# Patient Record
Sex: MALE | Race: WHITE | Employment: OTHER | URBAN - METROPOLITAN AREA
[De-identification: names, ages, dates, MRNs, and addresses within clinical notes are randomized per-mention and may not be internally consistent; named-entity substitution may affect disease eponyms.]

---

## 2018-03-29 ENCOUNTER — OFFICE VISIT (OUTPATIENT)
Dept: CARDIOLOGY CLINIC | Age: 83
End: 2018-03-29
Payer: MEDICARE

## 2018-03-29 VITALS
RESPIRATION RATE: 12 BRPM | DIASTOLIC BLOOD PRESSURE: 80 MMHG | SYSTOLIC BLOOD PRESSURE: 118 MMHG | WEIGHT: 179 LBS | HEART RATE: 89 BPM | BODY MASS INDEX: 25.06 KG/M2 | HEIGHT: 71 IN

## 2018-03-29 DIAGNOSIS — Z78.9 STATIN INTOLERANCE: ICD-10-CM

## 2018-03-29 DIAGNOSIS — E78.00 PURE HYPERCHOLESTEROLEMIA: ICD-10-CM

## 2018-03-29 DIAGNOSIS — I25.10 CORONARY ARTERY DISEASE INVOLVING NATIVE CORONARY ARTERY OF NATIVE HEART WITHOUT ANGINA PECTORIS: Primary | ICD-10-CM

## 2018-03-29 DIAGNOSIS — I25.2 HISTORY OF MI (MYOCARDIAL INFARCTION): ICD-10-CM

## 2018-03-29 PROCEDURE — 93000 ELECTROCARDIOGRAM COMPLETE: CPT | Performed by: INTERNAL MEDICINE

## 2018-03-29 PROCEDURE — 99213 OFFICE O/P EST LOW 20 MIN: CPT | Performed by: INTERNAL MEDICINE

## 2018-03-29 PROCEDURE — G8484 FLU IMMUNIZE NO ADMIN: HCPCS | Performed by: INTERNAL MEDICINE

## 2018-03-29 PROCEDURE — 1036F TOBACCO NON-USER: CPT | Performed by: INTERNAL MEDICINE

## 2018-03-29 PROCEDURE — 1123F ACP DISCUSS/DSCN MKR DOCD: CPT | Performed by: INTERNAL MEDICINE

## 2018-03-29 PROCEDURE — G8420 CALC BMI NORM PARAMETERS: HCPCS | Performed by: INTERNAL MEDICINE

## 2018-03-29 PROCEDURE — G8598 ASA/ANTIPLAT THER USED: HCPCS | Performed by: INTERNAL MEDICINE

## 2018-03-29 PROCEDURE — G8427 DOCREV CUR MEDS BY ELIG CLIN: HCPCS | Performed by: INTERNAL MEDICINE

## 2018-03-29 PROCEDURE — 4040F PNEUMOC VAC/ADMIN/RCVD: CPT | Performed by: INTERNAL MEDICINE

## 2018-03-29 NOTE — PROGRESS NOTES
Patient Active Problem List   Diagnosis    History of MI (myocardial infarction)    CAD (coronary artery disease)    Hyperlipidemia    Statin intolerance - myositis    Bradycardia       Current Outpatient Prescriptions   Medication Sig Dispense Refill    pravastatin (PRAVACHOL) 20 MG tablet Take 1 tablet by mouth daily 90 tablet 3    magnesium oxide (MAG-OX) 400 MG tablet Take 400 mg by mouth daily      Omega-3 Fatty Acids (OMEGA 3 PO) Take by mouth      Ascorbic Acid (VITAMIN C) 500 MG tablet Take 500 mg by mouth daily      riTUXimab (RITUXAN) 10 MG/ML chemo injection Infuse 375 mg/m2 intravenously once      levothyroxine (SYNTHROID) 175 MCG tablet Take 200 mcg by mouth daily       predniSONE (DELTASONE) 5 MG tablet Take 5 mg by mouth 3 times daily as needed.  Vitamin D (CHOLECALCIFEROL) 1000 UNITS CAPS capsule Take 1,000 Units by mouth daily.  metoprolol (TOPROL-XL) 25 MG XL tablet Take 50 mg by mouth daily       aspirin 81 MG tablet Take 81 mg by mouth daily. 2 daily      NAPROXEN PO Take 220 mg by mouth daily. No current facility-administered medications for this visit. CC:    Patient is seen in follow up for:  1. Coronary artery disease involving native coronary artery of native heart without angina pectoris    2. History of MI (myocardial infarction)    3. Statin intolerance - myositis    4. Pure hypercholesterolemia        HPI:  Patient is feeling well. Patient denies any shortness of breath, chest pain, lightheadedness or dizziness. Patient is tolerating Pravastatin. Exercising at rehab without problems.     ROS:   General: No unusual weight gain, no change in exercise tolerance  Skin: No rash or itching  EENT: No vision changes or nosebleeds  Cardiovascular: No orthopnea or paroxysmal nocturnal dyspnea  Respiratory: No cough or hemoptysis  Gastrointestinal: No hematemesis or recent changes in bowel habits  Genitourinary: No hematuria, urgency or tracing. ASSESSMENT:                                                     ORDERS:      1. Coronary artery disease involving native coronary artery of native heart without angina pectoris  EKG 12 Lead   2. History of MI (myocardial infarction)     3. Statin intolerance - myositis     4. Pure hypercholesterolemia       Above assessment cardiac issues stable. PLAN:   See above orders. Reviewed prior records and testing.  on 7/13/17. Continue pravastin. Consider Repatha if myositis reoccurs. Discussed issues that would prompt earlier evaluation. Follow-up office visit in 1 year  - pending above.

## 2018-06-01 ENCOUNTER — HOSPITAL ENCOUNTER (OUTPATIENT)
Age: 83
Discharge: HOME | End: 2018-06-01

## 2018-06-01 PROCEDURE — 9900000038 HC CARDIAC REHAB PHASE 3 - MONTHLY

## 2018-07-02 ENCOUNTER — HOSPITAL ENCOUNTER (OUTPATIENT)
Age: 83
Discharge: HOME | End: 2018-07-02

## 2018-07-02 PROCEDURE — 9900000038 HC CARDIAC REHAB PHASE 3 - MONTHLY

## 2018-08-01 ENCOUNTER — HOSPITAL ENCOUNTER (OUTPATIENT)
Age: 83
Discharge: HOME | End: 2018-08-01

## 2018-08-01 PROCEDURE — 9900000038 HC CARDIAC REHAB PHASE 3 - MONTHLY

## 2018-08-22 ENCOUNTER — HOSPITAL ENCOUNTER (OUTPATIENT)
Dept: ULTRASOUND IMAGING | Age: 83
Discharge: HOME | End: 2018-08-24
Payer: MEDICARE

## 2018-08-22 ENCOUNTER — HOSPITAL ENCOUNTER (OUTPATIENT)
Age: 83
Discharge: HOME | End: 2018-08-24
Payer: MEDICARE

## 2018-08-22 DIAGNOSIS — E04.1 NONTOXIC UNINODULAR GOITER: ICD-10-CM

## 2018-08-22 PROCEDURE — 76536 US EXAM OF HEAD AND NECK: CPT

## 2018-09-01 ENCOUNTER — HOSPITAL ENCOUNTER (OUTPATIENT)
Age: 83
Discharge: HOME | End: 2018-09-01

## 2018-09-01 PROCEDURE — 9900000038 HC CARDIAC REHAB PHASE 3 - MONTHLY

## 2018-09-07 ENCOUNTER — HOSPITAL ENCOUNTER (OUTPATIENT)
Age: 83
Discharge: HOME | End: 2018-09-09

## 2018-09-07 PROCEDURE — 88305 TISSUE EXAM BY PATHOLOGIST: CPT

## 2018-09-07 PROCEDURE — 87081 CULTURE SCREEN ONLY: CPT

## 2018-09-08 LAB — CLOTEST: NORMAL

## 2018-10-01 ENCOUNTER — HOSPITAL ENCOUNTER (OUTPATIENT)
Age: 83
Discharge: HOME | End: 2018-10-01

## 2018-10-01 PROCEDURE — 9900000038 HC CARDIAC REHAB PHASE 3 - MONTHLY

## 2018-11-01 ENCOUNTER — HOSPITAL ENCOUNTER (OUTPATIENT)
Age: 83
Discharge: HOME | End: 2018-11-01

## 2018-11-01 PROCEDURE — 9900000038 HC CARDIAC REHAB PHASE 3 - MONTHLY

## 2018-12-03 ENCOUNTER — HOSPITAL ENCOUNTER (OUTPATIENT)
Age: 83
Discharge: HOME | End: 2018-12-03

## 2018-12-03 PROCEDURE — 9900000038 HC CARDIAC REHAB PHASE 3 - MONTHLY

## 2019-01-15 ENCOUNTER — HOSPITAL ENCOUNTER (OUTPATIENT)
Dept: OCCUPATIONAL THERAPY | Age: 84
Setting detail: THERAPIES SERIES
Discharge: HOME | End: 2019-01-15
Payer: MEDICARE

## 2019-01-15 PROCEDURE — 97760 ORTHOTIC MGMT&TRAING 1ST ENC: CPT | Performed by: OCCUPATIONAL THERAPIST

## 2019-04-01 ENCOUNTER — HOSPITAL ENCOUNTER (OUTPATIENT)
Age: 84
Discharge: HOME | End: 2019-04-01

## 2019-04-01 PROCEDURE — 9900000038 HC CARDIAC REHAB PHASE 3 - MONTHLY

## 2019-05-01 ENCOUNTER — HOSPITAL ENCOUNTER (OUTPATIENT)
Age: 84
Discharge: HOME | End: 2019-05-01

## 2019-05-01 PROCEDURE — 9900000038 HC CARDIAC REHAB PHASE 3 - MONTHLY

## 2019-06-01 ENCOUNTER — HOSPITAL ENCOUNTER (OUTPATIENT)
Age: 84
Discharge: HOME | End: 2019-06-01

## 2019-06-01 PROCEDURE — 9900000038 HC CARDIAC REHAB PHASE 3 - MONTHLY

## 2019-07-08 ENCOUNTER — HOSPITAL ENCOUNTER (OUTPATIENT)
Age: 84
Discharge: HOME | End: 2019-07-08

## 2019-07-08 PROCEDURE — 9900000038 HC CARDIAC REHAB PHASE 3 - MONTHLY

## 2019-08-01 ENCOUNTER — HOSPITAL ENCOUNTER (OUTPATIENT)
Age: 84
Discharge: HOME | End: 2019-08-01

## 2019-08-06 ENCOUNTER — HOSPITAL ENCOUNTER (OUTPATIENT)
Age: 84
Discharge: HOME | End: 2019-08-08

## 2019-08-06 PROCEDURE — 88305 TISSUE EXAM BY PATHOLOGIST: CPT

## 2019-08-06 PROCEDURE — 87081 CULTURE SCREEN ONLY: CPT

## 2019-08-07 LAB — CLOTEST: NORMAL

## 2019-10-05 PROCEDURE — 9900000038 HC CARDIAC REHAB PHASE 3 - MONTHLY

## 2019-10-24 ENCOUNTER — OFFICE VISIT (OUTPATIENT)
Dept: CARDIOLOGY CLINIC | Age: 84
End: 2019-10-24
Payer: MEDICARE

## 2019-10-24 VITALS
DIASTOLIC BLOOD PRESSURE: 64 MMHG | HEART RATE: 79 BPM | WEIGHT: 181 LBS | SYSTOLIC BLOOD PRESSURE: 104 MMHG | RESPIRATION RATE: 16 BRPM | BODY MASS INDEX: 25.91 KG/M2 | HEIGHT: 70 IN

## 2019-10-24 DIAGNOSIS — I25.2 HX OF MYOCARDIAL INFARCTION: ICD-10-CM

## 2019-10-24 DIAGNOSIS — I25.10 CORONARY ARTERY DISEASE INVOLVING NATIVE CORONARY ARTERY OF NATIVE HEART WITHOUT ANGINA PECTORIS: Primary | ICD-10-CM

## 2019-10-24 DIAGNOSIS — Z95.5 STENTED CORONARY ARTERY: ICD-10-CM

## 2019-10-24 DIAGNOSIS — E78.1 HYPERTRIGLYCERIDEMIA: ICD-10-CM

## 2019-10-24 DIAGNOSIS — Z78.9 STATIN INTOLERANCE: ICD-10-CM

## 2019-10-24 PROCEDURE — 99214 OFFICE O/P EST MOD 30 MIN: CPT | Performed by: INTERNAL MEDICINE

## 2019-10-24 PROCEDURE — 1123F ACP DISCUSS/DSCN MKR DOCD: CPT | Performed by: INTERNAL MEDICINE

## 2019-10-24 PROCEDURE — 1036F TOBACCO NON-USER: CPT | Performed by: INTERNAL MEDICINE

## 2019-10-24 PROCEDURE — G8427 DOCREV CUR MEDS BY ELIG CLIN: HCPCS | Performed by: INTERNAL MEDICINE

## 2019-10-24 PROCEDURE — 93000 ELECTROCARDIOGRAM COMPLETE: CPT | Performed by: INTERNAL MEDICINE

## 2019-10-24 PROCEDURE — G8598 ASA/ANTIPLAT THER USED: HCPCS | Performed by: INTERNAL MEDICINE

## 2019-10-24 PROCEDURE — G8484 FLU IMMUNIZE NO ADMIN: HCPCS | Performed by: INTERNAL MEDICINE

## 2019-10-24 PROCEDURE — G8417 CALC BMI ABV UP PARAM F/U: HCPCS | Performed by: INTERNAL MEDICINE

## 2019-10-24 PROCEDURE — 4040F PNEUMOC VAC/ADMIN/RCVD: CPT | Performed by: INTERNAL MEDICINE

## 2019-10-24 RX ORDER — ICOSAPENT ETHYL 1000 MG/1
2 CAPSULE ORAL 2 TIMES DAILY
COMMUNITY
End: 2019-10-24 | Stop reason: SDUPTHER

## 2019-10-24 RX ORDER — ICOSAPENT ETHYL 1000 MG/1
2 CAPSULE ORAL 2 TIMES DAILY
Qty: 120 CAPSULE | Refills: 3 | Status: SHIPPED
Start: 2019-10-24 | End: 2020-08-12 | Stop reason: CLARIF

## 2019-10-24 RX ORDER — PANTOPRAZOLE SODIUM 20 MG/1
TABLET, DELAYED RELEASE ORAL
Refills: 3 | COMMUNITY
Start: 2019-10-11 | End: 2020-08-12 | Stop reason: CLARIF

## 2019-10-28 ENCOUNTER — HOSPITAL ENCOUNTER (OUTPATIENT)
Dept: CARDIAC REHAB | Age: 84
Discharge: HOME | End: 2019-10-28

## 2019-11-25 ENCOUNTER — HOSPITAL ENCOUNTER (OUTPATIENT)
Dept: CARDIAC REHAB | Age: 84
Discharge: HOME | End: 2019-11-25

## 2019-12-18 PROCEDURE — 9900000038 HC CARDIAC REHAB PHASE 3 - MONTHLY

## 2019-12-30 ENCOUNTER — HOSPITAL ENCOUNTER (OUTPATIENT)
Dept: CARDIAC REHAB | Age: 84
Discharge: HOME | End: 2019-12-30

## 2020-01-18 PROCEDURE — 9900000038 HC CARDIAC REHAB PHASE 3 - MONTHLY

## 2020-01-27 ENCOUNTER — HOSPITAL ENCOUNTER (OUTPATIENT)
Dept: CARDIAC REHAB | Age: 85
Discharge: HOME | End: 2020-01-27

## 2020-08-12 ENCOUNTER — OFFICE VISIT (OUTPATIENT)
Dept: CARDIOLOGY CLINIC | Age: 85
End: 2020-08-12
Payer: MEDICARE

## 2020-08-12 VITALS
HEART RATE: 84 BPM | SYSTOLIC BLOOD PRESSURE: 122 MMHG | TEMPERATURE: 98.8 F | HEIGHT: 70 IN | BODY MASS INDEX: 25.2 KG/M2 | WEIGHT: 176 LBS | RESPIRATION RATE: 16 BRPM | DIASTOLIC BLOOD PRESSURE: 78 MMHG

## 2020-08-12 PROCEDURE — 1036F TOBACCO NON-USER: CPT | Performed by: INTERNAL MEDICINE

## 2020-08-12 PROCEDURE — 99214 OFFICE O/P EST MOD 30 MIN: CPT | Performed by: INTERNAL MEDICINE

## 2020-08-12 PROCEDURE — G8427 DOCREV CUR MEDS BY ELIG CLIN: HCPCS | Performed by: INTERNAL MEDICINE

## 2020-08-12 PROCEDURE — 4040F PNEUMOC VAC/ADMIN/RCVD: CPT | Performed by: INTERNAL MEDICINE

## 2020-08-12 PROCEDURE — G8417 CALC BMI ABV UP PARAM F/U: HCPCS | Performed by: INTERNAL MEDICINE

## 2020-08-12 PROCEDURE — 1123F ACP DISCUSS/DSCN MKR DOCD: CPT | Performed by: INTERNAL MEDICINE

## 2020-08-12 PROCEDURE — 93000 ELECTROCARDIOGRAM COMPLETE: CPT | Performed by: INTERNAL MEDICINE

## 2020-08-12 RX ORDER — METFORMIN HYDROCHLORIDE 500 MG/1
TABLET, EXTENDED RELEASE ORAL
COMMUNITY
Start: 2020-07-14 | End: 2021-08-16

## 2020-08-12 RX ORDER — METOPROLOL SUCCINATE 50 MG/1
25 TABLET, EXTENDED RELEASE ORAL DAILY
COMMUNITY
End: 2022-05-03 | Stop reason: CLARIF

## 2020-08-12 NOTE — PROGRESS NOTES
Patient Active Problem List   Diagnosis    History of MI (myocardial infarction)    CAD (coronary artery disease)    Hyperlipidemia    Statin intolerance - myositis    Bradycardia    Hypertriglyceridemia    Hx of myocardial infarction    Stented coronary artery       Current Outpatient Medications   Medication Sig Dispense Refill    metFORMIN (GLUCOPHAGE-XR) 500 MG extended release tablet TAKE ONE TABLET BY MOUTH EVERY DAY      ASPIRIN 81 PO Take by mouth      pravastatin (PRAVACHOL) 20 MG tablet Take 1 tablet by mouth daily 90 tablet 3    levothyroxine (SYNTHROID) 175 MCG tablet Take 200 mcg by mouth daily       predniSONE (DELTASONE) 5 MG tablet Take 5 mg by mouth 3 times daily as needed.  Vitamin D (CHOLECALCIFEROL) 1000 UNITS CAPS capsule Take 1,000 Units by mouth daily.  metoprolol (TOPROL-XL) 25 MG XL tablet Take 50 mg by mouth daily       NAPROXEN PO Take 220 mg by mouth as needed        No current facility-administered medications for this visit. CC:    Patient is seen in follow up for:  1. Coronary artery disease involving native coronary artery of native heart without angina pectoris    2. Hx of myocardial infarction    3. Statin intolerance    4. Hypertriglyceridemia        HPI:  Patient is feeling well. Patient denies any shortness of breath, chest pain, lightheadedness or dizziness. Patient is tolerating limited dose pravastatin. Exercises 1/2-hour day on stationary bicycle without problems. Is having some more difficulties with general weakness and slow gait. Unable to tolerate the Vascepa due to pill size.     ROS:   General: No unusual weight gain, no change in exercise tolerance  Skin: No rash or itching  EENT: No vision changes or nosebleeds  Cardiovascular: No orthopnea or paroxysmal nocturnal dyspnea  Respiratory: No cough or hemoptysis  Gastrointestinal: No hematemesis or recent changes in bowel habits  Genitourinary: No hematuria, urgency or frequency  Musculoskeletal: No muscular weakness or joint swelling   Neurologic / Psychiatric: No incoordination or convulsions  Allergic / Immunologic/ Lymphatic / Endocrine: No anemia or bleeding tendency     Social History     Socioeconomic History    Marital status:      Spouse name: Not on file    Number of children: Not on file    Years of education: Not on file    Highest education level: Not on file   Occupational History    Not on file   Social Needs    Financial resource strain: Not on file    Food insecurity     Worry: Not on file     Inability: Not on file    Transportation needs     Medical: Not on file     Non-medical: Not on file   Tobacco Use    Smoking status: Never Smoker    Smokeless tobacco: Never Used   Substance and Sexual Activity    Alcohol use: No    Drug use: No    Sexual activity: Not on file   Lifestyle    Physical activity     Days per week: Not on file     Minutes per session: Not on file    Stress: Not on file   Relationships    Social connections     Talks on phone: Not on file     Gets together: Not on file     Attends Shinto service: Not on file     Active member of club or organization: Not on file     Attends meetings of clubs or organizations: Not on file     Relationship status: Not on file    Intimate partner violence     Fear of current or ex partner: Not on file     Emotionally abused: Not on file     Physically abused: Not on file     Forced sexual activity: Not on file   Other Topics Concern    Not on file   Social History Narrative    Not on file       CONSTITUTIONAL:  Well developed, well nourished    Vitals:    08/12/20 0751   BP: 122/78   Pulse: 84   Resp: 16   Temp: 98.8 °F (37.1 °C)   Weight: 176 lb (79.8 kg)   Height: 5' 10\" (1.778 m)     HEAD & FACE: Normocephalic. Symmetric. EYES: No xanthelasma. Conjunctivae not injected. EARS, NOSE, MOUTH & THROAT: Good dentition. No oral pallor or cyanosis. NECK: No JVD at 30 degrees.   No thyromegaly. RESPIRATORY: Clear to auscultation and percussion in all fields. No use of accessory muscle or intercostal retractions. CARDIOVASCULAR: Regular rhythm bradycardia. No lifts or thrills on palpitation. Auscultation with normal S1-S2 in intensity and splitting. No carotid bruits. Abdominal aorta not enlarged. Femoral arteries without bruits. Pedal pulses 2+. No edema. ABDOMEN: Soft without hepatic or splenic enlargement. No tenderness. MUSCULOSKELETAL: No kyphosis or scoliosis of the back. Good muscle strength and tone. No muscle atrophy. Normal gait and ability to undergo exercise stress testing. EXTREMITIES: No clubbing or cyanosis. SKIN: No Xanthomas or ulcerations. NEUROLOGIC: Oriented to time, place and person. Normal mood and affect. LYMPHATIC:  No palpable neck or supraclavicular nodes. No splenomegaly. EKG: Rene sinus rhythm. No change compared to prior tracing. ASSESSMENT:                                                     ORDERS:       Diagnosis Orders   1. Coronary artery disease involving native coronary artery of native heart without angina pectoris  EKG 12 Lead   2. Hx of myocardial infarction     3. Statin intolerance     4. Hypertriglyceridemia       Above assessment cardiac issues stable. PLAN:   See above orders. Reviewed prior records and testing:   Reviewed recent MRI with microvascular changes  Continue pravastin. Discussed recent lipid panel with  and triglycerides 183 on 3/2/2020. Discussed various options of care-will start on Alexsander Red OTC fish oil due to pill size. Discussed issues that would prompt earlier evaluation.      Follow-up office visit in 1 year

## 2020-10-21 ENCOUNTER — TELEPHONE (OUTPATIENT)
Dept: CARDIAC REHAB | Age: 85
End: 2020-10-21

## 2020-10-29 ENCOUNTER — HOSPITAL ENCOUNTER (OUTPATIENT)
Dept: SPEECH THERAPY | Age: 85
Setting detail: THERAPIES SERIES
Discharge: HOME | End: 2020-10-29
Payer: MEDICARE

## 2020-10-29 PROCEDURE — 92523 SPEECH SOUND LANG COMPREHEN: CPT

## 2020-10-29 PROCEDURE — 92610 EVALUATE SWALLOWING FUNCTION: CPT

## 2020-10-29 NOTE — PROGRESS NOTES
SPEECH LANGUAGE PATHOLOGY MEDICARE CERTIFICATION       Date: 10/29/2020  Patient: Anna Rios  Physician: Dr. Joaquim Mcdermott   MR Number: [id number]  Date of Birth: [birth year]        Certification/Recertification Period: October 29, 2020 through January 29, 2020      Diagnosis (to which the services are applicable): B13.36 Dysphagia, unspecified       Date of Onset: Patient was diagnosed with Parkinson's Disease roughly a year ago. Patient and patients daughter reported that his swallowing difficulties started about 2 years ago. He had his esophagus dilatated in 2018 and 2019 and does feel that helped with food sticking in his throat. He currently complains of difficulty swallowing some pills, as well as certain textures (especially bread). An informal bedside swallowing evaluation was completed on October 29, 2020 and this evaluation did not reveal any overt signs or symptoms of aspiration. He was able to masticate a dry solid with good oral clearance. His vocal quality was clear post swallow and he did not present with any coughing or throat clearing. He does report that he will inconsistently clear his throat during meals and that his vocal quality is raspy at times. Secondary to his diagnosis of Parkinson's Disease, it was agreed by patient, patients daughter, and SLP that he would benefit from a Video Swallow Study to fully assess his swallow and ensure he is not silently aspirating. This will also give us baseline swallow status for comparison down the road as Parkinson's is progressive. After the Video Swallow Study is completed, patient and SLP will determine the most appropriate plan of care to target his dysphagia. Functional Goals for Three Months:     1. Patient will undergo a Video Swallow Study for full assessment of his oropharyngeal swallow and to ensure he is not silently aspirating. After this study is completed, SLP and patient will determine an appropriate treatment plan.      Estimated Length of Treatment: Pending results of the Video Swallow Study       Frequent of Visits: Pending results of the Video Swallow Study       Types of Procedures: Video Swallow Study and if needed traditional swallow exercises       Rehabilitation Potential: Good       Electronically signed by:   Melissa Blakely M.S. [address]  Date: 10/29/2020        Please sign below and return by fax to [phone]. If you have any questions or concerns, please don't hesitate to contact me at [phone]. Thank you for your referral!       Physicians Signature: _____________________________   Date: ________________       By signing above, therapist's plan is approved by the physician.

## 2020-10-29 NOTE — PROGRESS NOTES
SPEECH/LANGUAGE PATHOLOGY  CLINICAL ASSESSMENT OF SWALLOWING FUNCTION    PATIENT NAME:  Beatrice Adams      :  1935      TODAY'S DATE:  10/29/2020      SUMMARY OF EVALUATION     DYSPHAGIA DIAGNOSIS:  Functional Oropharyngeal Swallow (unable to rule out silent aspiration bedside). SLP is recommending a Video Swallow Study for full assessment and baseline information secondary to patients diagnosis of Parkinson's Disease. It will be beneficial for treatment planning and long term planning to have a baseline swallow study completed. DIET RECOMMENDATIONS:  Regular consistency solids with  thin liquids until a Video Swallow Study is completed. FEEDING RECOMMENDATIONS:     Assistance level:  No assistance needed      Compensatory strategies recommended: Small bites/sips, pace during meals, Alternate consistencies, and No straw    SLP and patient discussed compensatory strategies to incorporate during meals. Energy conservation was reiterated, as well consuming small meals throughout the day. SLP encouraged him to take small bites/sips and take his pills in applesauce/yogurt secondary to complaints of them sticking once in awhile. Appropriate textures and preparation ideas were also discussed. THERAPY RECOMMENDATIONS:    A Video Swallow Study (MBSS) is recommended and requires a physician order. A full plan of care will be determined after a swallow study is completed. Patient also reports a history of esophageal dilatation x2 (2018 and 2019) and esophageal motility can also be examined during the swallow study. PROCEDURE     Consistencies Administered During the Evaluation   Liquids: thin liquid   Solids:  pureed foods and solid foods      Method of Intake:   cup, spoon  Self fed      Position:   Seated, upright                  RESULTS     Oral Stage: The oral stage of swallowing was within functional limits.   Timely mastication and good oral clearance post swallow with use of liquid wash. Pharyngeal Stage:      No signs of aspiration were noted during this evaluation however, silent aspiration cannot be ruled out at bedside. If silent aspiration is suspected, a Videofluoroscopic Study of Swallowing (MBS) is recommended and requires a physician order. Patient reports inconsistent throat clearing during meals, as well as sticking of solid foods at times (mostly breads and some pills). The Speech Language Pathologist (SLP) completed education with the patient regarding results of evaluation. Explained that Speech Pathology intervention is warranted  at this time (pending a Video Swallow Study)  Prognosis for improvements is good     This plan will be re-evaluated and revised in 1 week  if warranted. Patient stated goals: Agreed with above,   Treatment goals discussed with Patient and Family   The Patient and Family understand(s) the diagnosis, prognosis and plan of care         INTERVENTION/EDUCATION    Pt educated on above results and plan of care. Pt trained on compensatory strategies for safe swallow with good outcome. Pt encouraged to engage in question/answer session. All questions answered and pt verbalized understanding of above. CPT code:  43982  bedside swallow eval      [x]The admitting diagnosis and active problem list, as listed below have been reviewed prior to initiation of this evaluation. ADMITTING DIAGNOSIS: No admission diagnoses are documented for this encounter.      ACTIVE PROBLEM LIST:   Patient Active Problem List   Diagnosis    History of MI (myocardial infarction)    CAD (coronary artery disease)    Hyperlipidemia    Statin intolerance - myositis    Bradycardia    Hypertriglyceridemia    Hx of myocardial infarction    Stented coronary artery       Gene Castellanos M.S. CCC-SLP  SP [id number]  10/29/2020

## 2020-11-11 ENCOUNTER — HOSPITAL ENCOUNTER (OUTPATIENT)
Dept: GENERAL RADIOLOGY | Age: 85
Discharge: HOME | End: 2020-11-13
Payer: MEDICARE

## 2020-11-11 PROCEDURE — 92526 ORAL FUNCTION THERAPY: CPT

## 2020-11-11 PROCEDURE — 2500000003 HC RX 250 WO HCPCS: Performed by: RADIOLOGY

## 2020-11-11 PROCEDURE — 92611 MOTION FLUOROSCOPY/SWALLOW: CPT

## 2020-11-11 PROCEDURE — 74230 X-RAY XM SWLNG FUNCJ C+: CPT

## 2020-11-11 RX ADMIN — BARIUM SULFATE 15 ML: 0.81 POWDER, FOR SUSPENSION ORAL at 10:21

## 2020-11-11 RX ADMIN — BARIUM SULFATE 15 ML: 400 PASTE ORAL at 10:20

## 2020-11-11 RX ADMIN — BARIUM SULFATE 15 ML: 400 SUSPENSION ORAL at 10:21

## 2020-11-11 NOTE — PROGRESS NOTES
SPEECH LANGUAGE PATHOLOGY  DAILY PROGRESS NOTE        PATIENT NAME:  Mercy Corley      :  1935          TODAY'S DATE:  2020 ROOM:  Room/bed info not found        Speech Pathologist (SLP) completed education with the patient/family regarding procedure of  the Video Fluoroscopic Study of Swallowing (Modified Barium Swallow Study) prior to exam and also type of swallowing impairment following completion of evaluation. Reviewed diet recommendations  (Regular consistency solids with  thin liquids) and discussed compensatory strategies (Double swallow, Small bites/sips, Alternate solids and liquids, Throat clear and No straw) to ensure safe PO intake. Aspiration precautions were reviewed. Encouraged patient and/or family to engage SLP in unstructured Q&A session relative to identified deficit areas; indicated understanding of all information provided via satisfactory verbal response.     CPT code(s)   59596  dysphagia tx    Total minutes : 10 minutes
liquid due to  delayed laryngeal closure which cleared from the laryngeal vestibule spontaneously (transient) or cleared from the laryngeal vestibule with a cued, re-directive throat clear . Laryngeal penetration was minimal and occurred inconsistently   an absent cough/throat clear was noted                 ASPIRATION    Aspiration was not present during this evaluation         COMPENSATORY STRATEGIES       Compensatory strategies that were beneficial included Double swallow, Small bites/sips, Alternate solids and liquids, Throat clear and No straw and Compensatory strategies that were not beneficial included Chin tuck      STRUCTURAL/FUNCTIONAL ANOMALIES       No structural/functional anomalies were noted      CERVICAL ESOPHAGEAL STAGE :        The cervical esophagus appeared adequate                            Prognosis for improvements is good  This plan will be re-evaluated and revised in 1 week  if warranted. Patient stated goals: Agreed with above  Treatment goals discussed with Patient   The Patient understand(s) the diagnosis, prognosis and plan of care       CPT code:  27764  dysphagia study      [x]The admitting diagnosis and active problem list, as listed below have been reviewed prior to initiation of this evaluation.      ADMITTING DIAGNOSIS: Dysphagia, unspecified type [R13.10]     ACTIVE PROBLEM LIST:   Patient Active Problem List   Diagnosis    History of MI (myocardial infarction)    CAD (coronary artery disease)    Hyperlipidemia    Statin intolerance - myositis    Bradycardia    Hypertriglyceridemia    Hx of myocardial infarction    Stented coronary artery

## 2021-01-20 ENCOUNTER — IMMUNIZATION (OUTPATIENT)
Dept: PRIMARY CARE CLINIC | Age: 86
End: 2021-01-20
Payer: MEDICARE

## 2021-01-20 PROCEDURE — 0001A COVID-19, PFIZER VACCINE 30MCG/0.3ML DOSE: CPT | Performed by: NURSE PRACTITIONER

## 2021-01-20 PROCEDURE — 91300 COVID-19, PFIZER VACCINE 30MCG/0.3ML DOSE: CPT | Performed by: NURSE PRACTITIONER

## 2021-02-10 ENCOUNTER — IMMUNIZATION (OUTPATIENT)
Dept: PRIMARY CARE CLINIC | Age: 86
End: 2021-02-10
Payer: MEDICARE

## 2021-02-10 PROCEDURE — 91300 COVID-19, PFIZER VACCINE 30MCG/0.3ML DOSE: CPT | Performed by: NURSE PRACTITIONER

## 2021-02-10 PROCEDURE — 0002A COVID-19, PFIZER VACCINE 30MCG/0.3ML DOSE: CPT | Performed by: NURSE PRACTITIONER

## 2021-04-13 ENCOUNTER — HOSPITAL ENCOUNTER (OUTPATIENT)
Dept: OCCUPATIONAL THERAPY | Age: 86
Setting detail: THERAPIES SERIES
Discharge: HOME | End: 2021-04-13
Payer: MEDICARE

## 2021-04-13 PROCEDURE — 97760 ORTHOTIC MGMT&TRAING 1ST ENC: CPT

## 2021-04-13 NOTE — PROGRESS NOTES
Occupational Therapy      OCCUPATIONAL THERAPY /  Splint Application Only   Phone: [phone]   Fax: [phone]    Date:  2021      Patient Name:  Halina Lawson    :  1935    Restrictions/Precautions:  Rheumatoid Arthritis  Diagnosis:  Rheumatoid Arthritis       Treatment Diagnosis:  same  Insurance/Certification information:  [employer]  Referring Physician:  Dr. Mary Hunter., D.OKathrin Date of Surgery/Injury: Onset over past years  Plan of care signed (Y/N):  N  Visit# / total visits: 1 x visit for splint / [address]       Reason for Referral: Pt has RA. Pt displays significant ulnar deviation of right digits. Pt is here for a splint to help align fingers. Pt previously had one but it has broken. Splint Fabricated/Provided: R hand ulnar deviation splint    Education Provided: Patient was provided with verbal education/handout regarding splint care, wear, precautions, and hygiene    Splint Care: Splint can be washed with mild soap and cool water. Splint needs to air dry. Avoid leaving splint in or near a source of heat such as a hot car or a space heater. Use nail polish remover to remove stains on splint. Use Purell / Febreze  to decrease any odor that may develop. Splint Precuations: Patient was instructed to remove splint and contact therapist if the following occur:   1. Redness that lasts longer that 15-20 mins   2. Increased swelling   3. Increased pain   4. Pressure Sores    Wear Schedule: PRN     Plan: Pt was seen today for splint only. Therapist completed fabrication of a custom orthosis and also issued pt a prefab LMB Soft Core Ulnar Dev Splint. Patient did verbalize and demo ability to don/doff splints appropriately this session with good accuracy. Patient and his wife did verbalize understanding of splint precautions, splint care, and wear schedule this session.  Pt was instructed to contact therapy office if he had any questions or problems.          Candy Vergara [address], [id number]

## 2021-06-29 LAB
CHOLESTEROL, TOTAL: NORMAL
CHOLESTEROL/HDL RATIO: NORMAL
HDLC SERPL-MCNC: NORMAL MG/DL
LDL CHOLESTEROL CALCULATED: NORMAL
NONHDLC SERPL-MCNC: NORMAL MG/DL
TRIGL SERPL-MCNC: NORMAL MG/DL
VLDLC SERPL CALC-MCNC: NORMAL MG/DL

## 2021-08-16 ENCOUNTER — OFFICE VISIT (OUTPATIENT)
Dept: CARDIOLOGY CLINIC | Age: 86
End: 2021-08-16
Payer: MEDICARE

## 2021-08-16 VITALS
SYSTOLIC BLOOD PRESSURE: 104 MMHG | HEART RATE: 81 BPM | DIASTOLIC BLOOD PRESSURE: 62 MMHG | RESPIRATION RATE: 18 BRPM | BODY MASS INDEX: 22.85 KG/M2 | HEIGHT: 71 IN | WEIGHT: 163.2 LBS

## 2021-08-16 DIAGNOSIS — Z78.9 STATIN INTOLERANCE: ICD-10-CM

## 2021-08-16 DIAGNOSIS — I25.2 HX OF MYOCARDIAL INFARCTION: ICD-10-CM

## 2021-08-16 DIAGNOSIS — E78.1 HYPERTRIGLYCERIDEMIA: ICD-10-CM

## 2021-08-16 DIAGNOSIS — I25.10 CORONARY ARTERY DISEASE INVOLVING NATIVE CORONARY ARTERY OF NATIVE HEART WITHOUT ANGINA PECTORIS: Primary | ICD-10-CM

## 2021-08-16 PROCEDURE — G8427 DOCREV CUR MEDS BY ELIG CLIN: HCPCS | Performed by: INTERNAL MEDICINE

## 2021-08-16 PROCEDURE — 93000 ELECTROCARDIOGRAM COMPLETE: CPT | Performed by: INTERNAL MEDICINE

## 2021-08-16 PROCEDURE — G8420 CALC BMI NORM PARAMETERS: HCPCS | Performed by: INTERNAL MEDICINE

## 2021-08-16 PROCEDURE — 1036F TOBACCO NON-USER: CPT | Performed by: INTERNAL MEDICINE

## 2021-08-16 PROCEDURE — 99213 OFFICE O/P EST LOW 20 MIN: CPT | Performed by: INTERNAL MEDICINE

## 2021-08-16 PROCEDURE — 1123F ACP DISCUSS/DSCN MKR DOCD: CPT | Performed by: INTERNAL MEDICINE

## 2021-08-16 PROCEDURE — 4040F PNEUMOC VAC/ADMIN/RCVD: CPT | Performed by: INTERNAL MEDICINE

## 2021-08-16 RX ORDER — FOLIC ACID 1 MG/1
1 TABLET ORAL DAILY
COMMUNITY
End: 2022-03-23

## 2021-08-16 NOTE — PROGRESS NOTES
Patient Active Problem List   Diagnosis    History of MI (myocardial infarction)    CAD (coronary artery disease)    Hyperlipidemia    Statin intolerance - myositis    Bradycardia    Hypertriglyceridemia    Hx of myocardial infarction    Stented coronary artery       Current Outpatient Medications   Medication Sig Dispense Refill    carbidopa-levodopa (SINEMET)  MG per tablet Take 1.5 pills three times per day (7 am, 12 pm, 5 pm)      folic acid (FOLVITE) 1 MG tablet Take 1 mg by mouth daily      methotrexate (RHEUMATREX) 2.5 MG chemo tablet Take by mouth once a week 5 tablets weekly      ASPIRIN 81 PO Take by mouth      metoprolol succinate (TOPROL XL) 50 MG extended release tablet Take 50 mg by mouth daily      pravastatin (PRAVACHOL) 20 MG tablet Take 1 tablet by mouth daily 90 tablet 3    levothyroxine (SYNTHROID) 175 MCG tablet Take 175 mcg by mouth Daily       predniSONE (DELTASONE) 5 MG tablet Take 5 mg by mouth 3 times daily as needed.  Vitamin D (CHOLECALCIFEROL) 1000 UNITS CAPS capsule Take 1,000 Units by mouth daily.  NAPROXEN PO Take 220 mg by mouth as needed        No current facility-administered medications for this visit. CC:    Patient is seen in follow up for:  1. Coronary artery disease involving native coronary artery of native heart without angina pectoris    2. Hx of myocardial infarction    3. Statin intolerance    4. Hypertriglyceridemia        HPI:  Patient is feeling well. Patient denies any shortness of breath, chest pain, lightheadedness or dizziness. Patient is tolerating limited dose pravastatin. Is having some more difficulties with general weakness and slow gait due to Parkinsons. Using walker. Unable to tolerate the Vascepa due to pill size.     ROS:   General: No unusual weight gain, no change in exercise tolerance  Skin: No rash or itching  EENT: No vision changes or nosebleeds  Cardiovascular: No orthopnea or paroxysmal nocturnal dyspnea  Respiratory: No cough or hemoptysis  Gastrointestinal: No hematemesis or recent changes in bowel habits  Genitourinary: No hematuria, urgency or frequency  Musculoskeletal: No muscular weakness or joint swelling   Neurologic / Psychiatric: No incoordination or convulsions  Allergic / Immunologic/ Lymphatic / Endocrine: No anemia or bleeding tendency     Social History     Socioeconomic History    Marital status:      Spouse name: Not on file    Number of children: Not on file    Years of education: Not on file    Highest education level: Not on file   Occupational History    Not on file   Tobacco Use    Smoking status: Never Smoker    Smokeless tobacco: Never Used   Vaping Use    Vaping Use: Never used   Substance and Sexual Activity    Alcohol use: No    Drug use: No    Sexual activity: Not on file   Other Topics Concern    Not on file   Social History Narrative    Not on file     Social Determinants of Health     Financial Resource Strain:     Difficulty of Paying Living Expenses:    Food Insecurity:     Worried About Running Out of Food in the Last Year:     [address] in the Last Year:    Transportation Needs:     Lack of Transportation (Medical):      Lack of Transportation (Non-Medical):    Physical Activity:     Days of Exercise per Week:     Minutes of Exercise per Session:    Stress:     Feeling of Stress :    Social Connections:     Frequency of Communication with Friends and Family:     Frequency of Social Gatherings with Friends and Family:     Attends [employer] Services:     Active Member of Clubs or Organizations:     Attends Club or Organization Meetings:     Marital Status:    Intimate Partner Violence:     Fear of Current or Ex-Partner:     Emotionally Abused:     Physically Abused:     Sexually Abused:        CONSTITUTIONAL:  Well developed, well nourished    Vitals:    08/16/21 0908   BP: 104/62   Pulse: 81   Resp: 18   Weight: 163 lb 3.2 oz (74 kg)   Height: 5' 11\" (1.803 m)     HEAD & FACE: Normocephalic. Symmetric. EYES: No xanthelasma. Conjunctivae not injected. EARS, NOSE, MOUTH & THROAT: Good dentition. No oral pallor or cyanosis. NECK: No JVD at 30 degrees. No thyromegaly. RESPIRATORY: Clear to auscultation and percussion in all fields. No use of accessory muscle or intercostal retractions. CARDIOVASCULAR: Regular rhythm bradycardia. No lifts or thrills on palpitation. Auscultation with normal S1-S2 in intensity and splitting. No carotid bruits. Abdominal aorta not enlarged. Femoral arteries without bruits. Pedal pulses 2+. No edema. ABDOMEN: Soft without hepatic or splenic enlargement. No tenderness. MUSCULOSKELETAL: No kyphosis or scoliosis of the back. Good muscle strength and tone. No muscle atrophy. Normal gait and ability to undergo exercise stress testing. EXTREMITIES: No clubbing or cyanosis. SKIN: No Xanthomas or ulcerations. NEUROLOGIC: Oriented to time, place and person. Normal mood and affect. LYMPHATIC:  No palpable neck or supraclavicular nodes. No splenomegaly. EKG: Rene sinus rhythm. No change compared to prior tracing. QTc 378 ms. ASSESSMENT:                                                     ORDERS:       Diagnosis Orders   1. Coronary artery disease involving native coronary artery of native heart without angina pectoris  EKG 12 Lead   2. Hx of myocardial infarction     3. Statin intolerance     4. Hypertriglyceridemia       Above assessment cardiac issues stable. PLAN:   See above orders. Reviewed prior records and testing:   Reviewed recent MRI with microvascular changes  Continue pravastin. Discussed recent lipid panel with  and triglycerides 183 on 3/2/2020. Obtain most recent studies. Discussed issues that would prompt earlier evaluation.      Follow-up office visit in 1 year

## 2021-09-20 ENCOUNTER — HOSPITAL ENCOUNTER (OUTPATIENT)
Dept: SPEECH THERAPY | Age: 86
Setting detail: THERAPIES SERIES
Discharge: HOME | End: 2021-09-20
Payer: MEDICARE

## 2021-09-20 PROCEDURE — 92610 EVALUATE SWALLOWING FUNCTION: CPT | Performed by: SPEECH-LANGUAGE PATHOLOGIST

## 2021-09-20 NOTE — PROGRESS NOTES
SPEECH/LANGUAGE PATHOLOGY  CLINICAL ASSESSMENT OF SWALLOWING FUNCTION   and PLAN OF CARE    PATIENT NAME:  Christie Catalan  (male)     MRN:  [id number]    :  1935  (81 y.o.)  STATUS:  Outpatient    TODAY'S DATE:  2021  REFERRING PROVIDER:   Dr. Rawls Erps: Speech language pathology evaluation Date of order:  2021  REASON FOR REFERRAL: Increase with difficulty swallowing/Patient with Parkinson's   EVALUATING THERAPIST: Laci Tariq, SLP                 RESULTS:    DYSPHAGIA DIAGNOSIS:   Clinical indicators of moderate oropharyngeal phase dysphagia       DIET RECOMMENDATIONS:  Soft and bite size consistency solids (dysphagia 3) with  honey consistency (moderately thick) liquids     FEEDING RECOMMENDATIONS:     Assistance level:  No assistance needed      Compensatory strategies recommended: Double swallow, Small bites/sips, Alternate solids and liquids and Throat clear;  No straw       Discussed recommendations with nursing and/or faxed report to referring provider: Yes    SPEECH THERAPY  PLAN OF CARE   The dysphagia POC is established based on physician order, dysphagia diagnosis and results of clinical assessment     Outpatient OR Home Care Skilled SLP intervention for dysphagia management is recommended 1-2 times per week to address the established treatment plan    Conditions Requiring Skilled Therapeutic Intervention for dysphagia:    Throat clearing during PO intake   Wet vocal quality during PO intake  Coughing during PO intake    Oral motor strength/coordination impairment    Specific dysphagia interventions to include:     Compensatory strategy training   Therapeutic exercises    Specific instructions for next treatment:  development and training of compensatory swallow strategies to improve airway protection and swallow function and initiate instruction of therapeutic exercises   Patient Treatment Goals:    Short Term Goals:  Pt will implement identified compensatory swallowing strategies on 90% of opportunities or greater to improve airway protection and swallow function. Pt will complete oral motor strength/ coordination exercises to improve bolus prep/ control and mastication with  minimal verbal prompts . Pt will complete BOTR strength/ ROM exercises to reduce pharyngeal residuals and improve epiglottic inversion minimal verbal prompts  Pt will complete laryngeal strength/ ROM therapeutic exercises to improve airway protection for the least restrictive PO diet minimal verbal prompts  Pt will complete Shaker and/or Chin Tuck Against Resistance (CTAR) to increase UES relaxation diameter and increase anterior laryngeal excursion to reduce pharyngeal residuals and reduce risk of pen/asp with minimal verbal prompts. Pt will complete Effortful Swallow therapeutically to target increased oral and base of tongue pressure, increased pharyngeal constrictor contractions, and increased UES relaxation duration to reduce pharyngeal residue with minimal verbal prompts   Pt will complete Radha Maneuver therapeutically to target increased pharyngeal constrictor contractions to reduce pharyngeal residue with minimal verbal prompts     Long Term Goals:   Pt will maintain adequate nutrition/hydration via PO intake of the least restrictive oral diet with implementation of safe swallow/ compensatory strategies and decrease signs/symptoms of aspiration to less than 1 x/day. Pt will improve oropharyngeal swallow function to ensure airway protection during PO intake to maintain adequate nutrition/hydration and decrease signs/symptoms of aspiration to less than 1 x/day.       Patient/family Goal:    To be able to eat regular foods and drink regular liquids    Plan of care discussed with Patient and Family   The Patient and Family understand(s) the diagnosis, prognosis and plan of care     Rehabilitation Potential/Prognosis: good depending upon medical condition                    ADMITTING DIAGNOSIS: Dysphagia, unspecified [R13.10]    VISIT DIAGNOSIS: moderate oropharyngeal dysphagia    PATIENT REPORT/COMPLAINT: increased difficulty with swallowing since last video swallow study on 11/11/2020. His daughter, Miri Santiago, reports increased coughing when drinking. He tends to hyperextend his neck during cup sips of liquids which increases the coughing. Recommendation provided to obtain a nosey cut-out cup. Miri Santiago reports that her father was diagnosed with Parkinson's disease last fall. Alexanderkaren Khai states that he has noticed increased difficulty with balance and fatigue levels. He reports that he is receiving outpatient physical therapy at another clinic. PRIOR LEVEL OF SWALLOW FUNCTION:    PAST HISTORY OF DYSPHAGIA?: yes    Home diet: Regular consistency solids with  thin liquids    PROCEDURE:  Consistencies Administered During the Evaluation   Liquids: thin liquid, nectar thick liquid and honey thick liquid   Solids:  pureed foods       Method of Intake:   cup, spoon  Self fed      Position:   Seated, upright    CLINICAL ASSESSMENT:  Oral Stage:       Overall oral motor weakness      Pharyngeal Stage:    Throat clearing present after presentation of thin liquid and nectar consistency liquid  Immediate wet cough was noted after presentation of thin liquid and nectar consistency liquid  Latent wet cough was noted after presentation of thin liquid and nectar consistency liquid    Cognition:   Within functional limits for this exam    Oral Peripheral Examination   Generalized oral weakness    Current Respiratory Status    room air     Parameters of Speech Production  Respiration:  Adequate for speech production  Quality:   Within functional limits  Intensity: Within functional limits    Volitional Swallow: present     Volitional Cough:   present     Pain: No pain reported.     EDUCATION:   The Speech Language Pathologist (SLP) completed education regarding results of evaluation and that intervention is warranted at this time. Learner: Patient and Family  Education: Reviewed results and recommendations of this evaluation, Reviewed diet and strategies, Reviewed signs, symptoms and risks of aspiration and Demonstrated how to thicken liquids appropriately  Evaluation of Education:  Verbalizes understanding    This plan may be re-evaluated and revised as warranted. Evaluation Time includes thorough review of current medical information, gathering information on past medical history/social history and prior level of function, completion of standardized testing/informal observation of tasks, assessment of data and education on plan of care and goals. [x]The admitting diagnosis and active problem list, have been reviewed prior to initiation of this evaluation. ACTIVE PROBLEM LIST:   Patient Active Problem List   Diagnosis    History of MI (myocardial infarction)    CAD (coronary artery disease)    Hyperlipidemia    Statin intolerance - myositis    Bradycardia    Hypertriglyceridemia    Hx of myocardial infarction    Stented coronary artery         CPT code:  58068  bedside swallow rubia Ha Player. Mata Baum MA/[facility]-SLP  [phone]    I CERTIFY THAT THIS EVALUATION AND PLAN OF CARE FOR SPEECH THERAPY SERVICES ARE APPROPRIATE AND MEDICALLY NECESSARY.     DURATION:  FROM:________09/20/2021_______________THRU________12/19/2021________________              ________________________________________________________________________  PHYSICIAN'S SIGNATURE                                                                         DATE

## 2021-09-28 ENCOUNTER — HOSPITAL ENCOUNTER (OUTPATIENT)
Dept: SPEECH THERAPY | Age: 86
Setting detail: THERAPIES SERIES
Discharge: HOME | End: 2021-09-28
Payer: MEDICARE

## 2021-09-28 PROCEDURE — 92526 ORAL FUNCTION THERAPY: CPT | Performed by: SPEECH-LANGUAGE PATHOLOGIST

## 2021-09-28 NOTE — PROGRESS NOTES
SPEECH LANGUAGE PATHOLOGY  DAILY PROGRESS NOTE        PATIENT NAME:  Miguel Dobson      :  1935          TODAY'S DATE:  2021 ROOM:  Room/bed info not found    Pt seen for dysphagia therapy for 45 minutes. Pt's wife and daughter Nikolas Burton were present for session. Pt and daughter were very interactive during session. Pt and daughter do report some instances of coughing, and report that they have chosen not to thicken liquids at this time. SLP provided a verbal review and written education r/t type of swallowing impairment, diet recommendations and rationale for the above, signs of aspiration, risks of aspiration and its complications, and appropriate therapeutic exercises. Pt completed new exercises as instructed with good effort/ fair ability; reports that he has been doing exercises from last session as well, to which he credits some improvement. Pt, daughter, and wife discussed next session; as it is hard for Pt to transfer in/out of car, they wish to implement the HEP as outlined above, and come back in two weeks (Oct 11 @ 11:00). Instructions provided to complete exercises. Will cont POC.     CPT code(s) 47018  dysphagia tx  Total minutes :  45 minutes      John Gannon., [address]  [id number]  2021

## 2021-10-12 ENCOUNTER — HOSPITAL ENCOUNTER (OUTPATIENT)
Dept: SPEECH THERAPY | Age: 86
Setting detail: THERAPIES SERIES
Discharge: HOME | End: 2021-10-12
Payer: MEDICARE

## 2021-10-12 NOTE — PROGRESS NOTES
Speech-Language Pathology  Cancellation/No Show Note      For today's appointment patient:    []  Cancelled                  []  Rescheduled appointment    [x]  No show       []  Therapist cancelled             Reason given by patient:  [x]  No reason given  []  Conflicting appointment  []  No transportation  []  Conflict with work  []  Illness  []  [address] weather   []  Insurance related issues  []  Other           Comments: SLP left voicemail appointment reminder on 10/8/2021     Continue as per established Jake Raymundo M.A., [address] [id number]  10/12/2021

## 2021-10-19 ENCOUNTER — HOSPITAL ENCOUNTER (OUTPATIENT)
Dept: SPEECH THERAPY | Age: 86
Setting detail: THERAPIES SERIES
Discharge: HOME | End: 2021-10-19
Payer: MEDICARE

## 2021-10-19 NOTE — PROGRESS NOTES
Speech-Language Pathology  Cancellation/No Show Note      For today's appointment patient:    [x]  Cancelled                  []  Rescheduled appointment    []  No show       []  Therapist cancelled             Reason given by patient:  [x]  No reason given  []  Conflicting appointment  []  No transportation  []  Conflict with work  []  Illness  []  Inclement weather   []  Insurance related issues  []  Other           Comments: Karyl Babinski daughter, cancelled appointment at 9:40am 10/18/2021. Will come next Tuesday Oct. 26, 2021 at 11am.    Continue as per established Annel Calderon.  Fidel BRANTLEY, [address] [id number]  10/19/2021

## 2021-10-26 ENCOUNTER — HOSPITAL ENCOUNTER (OUTPATIENT)
Dept: SPEECH THERAPY | Age: 86
Setting detail: THERAPIES SERIES
Discharge: HOME | End: 2021-10-26
Payer: MEDICARE

## 2021-10-26 PROCEDURE — 92526 ORAL FUNCTION THERAPY: CPT

## 2021-11-09 ENCOUNTER — HOSPITAL ENCOUNTER (OUTPATIENT)
Dept: SPEECH THERAPY | Age: 86
Setting detail: THERAPIES SERIES
Discharge: HOME | End: 2021-11-09
Payer: MEDICARE

## 2021-11-09 PROCEDURE — 92526 ORAL FUNCTION THERAPY: CPT

## 2021-11-09 NOTE — PROGRESS NOTES
SPEECH LANGUAGE PATHOLOGY  DAILY PROGRESS NOTE        PATIENT NAME:  Franklin Torres      :  1935          TODAY'S DATE:  2021 ROOM:  Room/bed info not found      SUBJECTIVE:    Pt seen for dysphagia therapy for 30 minutes. Pt's wife present for session. Pt pleasant and cooperative. OBJECTIVE:   Pt continues to report that he has chosen not to thicken liquids at this time. Pt denies any signs of aspiration. SLP continues to verbally reviewed diet recommendations, rationale, signs of aspiration, risks of aspiration and its complications. He understands the risks of aspiration if he continues thin liquid intake. Pt continues to express he has no desire to thicken his liquids. In terms of compensatory strategies, it was encouraged that he take small bites/sips, pace himself during meals, alternate soilds/liquids, and eat smaller. Pt reports to complete some therapeutic exercises at home. Continued tongue base and laryngeal strengthening exercises this date. Patient was able to complete the CTAR, Radha, and various laryngeal strengthening and tongue base strengthening exercises given verbal/visual cues; CTAR completed with fair ability, Masko completed with fair ability, and Effortful swallow completed with fair+ ability. Hand-outs provided for continued carryover within the home environment. SLP recommends to complete MBSS. Pt and family in agreement. Will cont POC. CPT code(s) 10682  dysphagia tx  Total minutes :  30 minutes    Miles Lo.  Gilberto Parra M.A.. [id number]  2021

## 2021-11-23 ENCOUNTER — HOSPITAL ENCOUNTER (OUTPATIENT)
Dept: SPEECH THERAPY | Age: 86
Setting detail: THERAPIES SERIES
Discharge: HOME | End: 2021-11-23
Payer: MEDICARE

## 2021-11-23 PROCEDURE — 92526 ORAL FUNCTION THERAPY: CPT

## 2021-11-23 NOTE — PROGRESS NOTES
SPEECH LANGUAGE PATHOLOGY  DAILY PROGRESS NOTE        PATIENT NAME:  Ronnie Fernandez      :  1935          TODAY'S DATE:  2021 ROOM:  Room/bed info not found      SUBJECTIVE:    Pt seen for dysphagia therapy for 30 minutes. Pt's wife present for session. Pt pleasant and cooperative. OBJECTIVE:     Patient reported he has only been completed exercises 1x a day instead as 2-3 as instructed by SLP. SLP encouraged to increase the amount of times completed a day. SLP also provided education on the importance of practicing the exercises at home. Patient continues to report that he has chosen not to thicken liquids at this time. Patient denies any signs of aspiration. SLP continues to verbally reviewed diet recommendations, rationale, signs of aspiration, risks of aspiration and its complications. He understands the risks of aspiration if he continues thin liquid intake. Patient continues to express he has no desire to thicken his liquids. In terms of compensatory strategies, it was encouraged that he take small bites/sips, pace himself during meals, alternate soilds/liquids, and eat smaller. Patientt reports to complete some therapeutic exercises at home. Therapy targeted the following: An oral motor exercise program was reviewed with patient. Each exercise was modeled for the patient. Patient completed fair return demonstration of each exercise. Lingual strength, endurance, range of motion, and coordination was rated as fair-. Labial strength, endurance, coordination, and range of motion was rated as fair. Laryngeal strength, endurance, and range of motion was rated as fair-. Tactile cues occasionally help the patient. A mirror was also provided as feedback. Therapy targeted Chin Tuck Against Resistance exercises to improve UES opening during swallowing. A handout with instructions was previously provided.  Patient performed three sustained one minute chin tucks against resistance with one minute rest break in between. Lastly, the patient performed 30 consecutive chin tucks against resistance. Patient was instructed to complete this exercise program three times per day. He was instructed to discontinue the exercises if he experienced any pain or discomfort. Patient was in agreement. Continue plan of care. Treatment plan and goals can be found in the initial evaluation/progress report. Will cont POC. CPT code(s) 00957  dysphagia tx  Total minutes :  30 minutes    Malissa Damon.  Fidel BRANTLEY, [address] [id number]  11/23/2021

## 2021-12-07 ENCOUNTER — HOSPITAL ENCOUNTER (OUTPATIENT)
Dept: SPEECH THERAPY | Age: 86
Setting detail: THERAPIES SERIES
Discharge: HOME | End: 2021-12-07
Payer: MEDICARE

## 2021-12-07 PROCEDURE — 92526 ORAL FUNCTION THERAPY: CPT

## 2021-12-07 NOTE — PROGRESS NOTES
SPEECH LANGUAGE PATHOLOGY  DAILY PROGRESS NOTE        PATIENT NAME:  Neel Peng      :  1935          TODAY'S DATE:  2021     SUBJECTIVE:    Pt seen for dysphagia therapy for 30 minutes. Pt's wife present for session. Pt pleasant and cooperative. OBJECTIVE:     Patient reports he has been following the home program. Patient has been using small bites/sips with chin tuck as instructed and patient reports no issues/concerns. Patient denies any signs of aspiration. Patient accepted oral trials of thin liquids this date using compensatory strategies small sips and chin tuck. Patient produced good outcome. No over s/s of aspiration observed. However, silent aspiration cannot be ruled out. Upgrade patient diet to: Easy to chew consistency solids (IDDSI level 7, transitional) with  thin liquids (IDDSI level 0) with consistent use of compensatory strategies (Small bites/sips, Alternate solids and liquids and Throat clear, Chin Tuck; No straw)         An oral motor exercise program was reviewed with patient. Each exercise was modeled for the patient. Patient completed fair return demonstration of each exercise. Lingual strength, endurance, range of motion, and coordination was rated as fair+. Labial strength, endurance, coordination, and range of motion was rated as fair+. Laryngeal strength, endurance, and range of motion was rated as fair. Tactile cues occasionally help the patient. A mirror was also provided as feedback. Therapy targeted Chin Tuck Against Resistance exercises to improve UES opening during swallowing. A handout with instructions was previously provided. Patient performed three sustained one minute chin tucks against resistance with one minute rest break in between. The patient performed 30 consecutive chin tucks against resistance. Patient produced good outcome. Patient completed Radha exercise 5x with good outcome.  Patient was instructed to complete this exercise program three times per day. He was instructed to discontinue the exercises if he experienced any pain or discomfort. Patient was in agreement. Continue plan of care. Treatment plan and goals can be found in the initial evaluation/progress report. Will cont POC. CPT code(s) 39766  dysphagia tx  Total minutes :  30 minutes    Daniel Sadler M.A.. [id number]  12/7/2021

## 2021-12-21 ENCOUNTER — HOSPITAL ENCOUNTER (OUTPATIENT)
Dept: SPEECH THERAPY | Age: 86
Setting detail: THERAPIES SERIES
Discharge: HOME | End: 2021-12-21
Payer: MEDICARE

## 2021-12-21 PROCEDURE — 92526 ORAL FUNCTION THERAPY: CPT

## 2021-12-21 NOTE — PROGRESS NOTES
[address]  Outpatient Speech Therapy  Phone: [phone] Fax: [phone] [address] SUMMARY    Date of Report: 2021    Patient Carie Mancilla  : 1935  MRN: [id number]    Diagnosis: R13.10 Dysphagia, unspecified type   Referring Physician: Dr. Dee Mae  Patient attended 6 speech therapy sessions from 21 to 21 , with 1 cancellation and 1 no show. Focus of current treatment sessions has been on dysphagia management. OBJECTIVE / GOAL STATUS   (Status Key: GM = Goal Met, MP = Making Progress, BP = Beginning Progress, NI = Not Introduced, D/C = Discontinue Goal, NM = Not Met)      Short Term Goals:  Pt will implement identified compensatory swallowing strategies on 90% of opportunities or greater to improve airway protection and swallow function. GM  Pt will complete oral motor strength/ coordination exercises to improve bolus prep/ control and mastication with  minimal verbal prompts . GM  Pt will complete BOTR strength/ ROM exercises to reduce pharyngeal residuals and improve epiglottic inversion minimal verbal prompts GM  Pt will complete laryngeal strength/ ROM therapeutic exercises to improve airway protection for the least restrictive PO diet minimal verbal prompts GM  Pt will complete Shaker and/or Chin Tuck Against Resistance (CTAR) to increase UES relaxation diameter and increase anterior laryngeal excursion to reduce pharyngeal residuals and reduce risk of pen/asp with minimal verbal prompts.  GM  Pt will complete Effortful Swallow therapeutically to target increased oral and base of tongue pressure, increased pharyngeal constrictor contractions, and increased UES relaxation duration to reduce pharyngeal residue with minimal verbal prompts GM  Pt will complete Radha Maneuver therapeutically to target increased pharyngeal constrictor contractions to reduce pharyngeal residue with minimal verbal prompts GM     Long Term Goals:              Pt will maintain adequate nutrition/hydration via PO intake of the least restrictive oral diet with implementation of safe swallow/ compensatory strategies and decrease signs/symptoms of aspiration to less than 1 x/day. GM  Pt will improve oropharyngeal swallow function to ensure airway protection during PO intake to maintain adequate nutrition/hydration and decrease signs/symptoms of aspiration to less than 1 x/day. GM        ASSESSMENT / STANDARDIZED TEST RESULTS  Patient has made good progress over the past 3 months. RECOMMENDATIONS  Patient is discharged at this time as he has met all therapy goals. Patient diet upgraded to Easy to chew consistency solids (IDDSI level 7, transitional) with  thin liquids (IDDSI level 0) and use of compensatory stratagies. Patient and/or caregiver aware of diagnosis? yes  Patient and/or caregiver agree with POC? yes      Thank you for this referral.     Tammie Minor.  Fidel BRANTLEY, [place]. [phone]

## 2021-12-21 NOTE — PROGRESS NOTES
SPEECH LANGUAGE PATHOLOGY  DAILY PROGRESS NOTE        PATIENT NAME:  Farrah Ordaz      :  1935          TODAY'S DATE:  2021         CLINICAL ASSESSMENT OF SWALLOWING FUNCTION                  RESULTS:    DYSPHAGIA DIAGNOSIS:   Clinical indicators of normal swallow function at this time (unable to rule out silent aspiration bedside)      DIET RECOMMENDATIONS:  Easy to chew consistency solids (IDDSI level 7, transitional) with  thin liquids (IDDSI level 0)     FEEDING RECOMMENDATIONS:     Assistance level:  No assistance needed      Compensatory strategies recommended: Chin tuck, Small bites/sips, Alternate solids and liquids and No straw      Discussed recommendations with nursing and/or faxed report to referring provider: Yes    SPEECH THERAPY  PLAN OF CARE   The dysphagia POC is established based on physician order, dysphagia diagnosis and results of clinical assessment     Dysphagia therapy is not recommended     Conditions Requiring Skilled Therapeutic Intervention for dysphagia:    Not applicable    Specific dysphagia interventions to include:     Not applicable    Specific instructions for next treatment:  not applicable   Patient Treatment Goals:    Short Term Goals:  Not applicable no therapy warranted     Long Term Goals:   Not applicable no therapy warranted      Patient/family Goal:    not applicable    Plan of care discussed with Patient and Family   The Patient and Family understand(s) the diagnosis, prognosis and plan of care     Rehabilitation Potential/Prognosis: not applicable                    ADMITTING DIAGNOSIS: No admission diagnoses are documented for this encounter. VISIT DIAGNOSIS: R13.10    PATIENT REPORT/COMPLAINT: None -- patient and patient's wife report no concerns at this time.       PRIOR LEVEL OF SWALLOW FUNCTION:    PAST HISTORY OF DYSPHAGIA?: yes      PROCEDURE:  Consistencies Administered During the Evaluation   Liquids: thin liquid   Solids:  pureed foods and solid foods      Method of Intake:   cup, straw, spoon  Self fed      Position:   Seated, upright    CLINICAL ASSESSMENT:  Oral Stage: The oral stage of swallowing was within functional limits      Pharyngeal Stage:    No signs of aspiration were noted during this evaluation however, silent aspiration cannot be ruled out at bedside. If silent aspiration is suspected, a Videofluoroscopic Study of Swallowing (MBS) is recommended and requires a physician order. Cognition:   Within functional limits for this exam    Oral Peripheral Examination   Adequate lingual/labial strength     Current Respiratory Status    room air     Parameters of Speech Production  Respiration:  Adequate for speech production  Quality:   Within functional limits  Intensity: Within functional limits    Volitional Swallow: present     Volitional Cough:   present     Pain: No pain reported. EDUCATION:   The Speech Language Pathologist (SLP) completed education regarding results of evaluation and that intervention is not warranted at this time. Learner: Patient and Significant Other  Education: Reviewed results and recommendations of this evaluation  Evaluation of Education:  Verbalizes understanding    This plan may be re-evaluated and revised as warranted. Treatment goals discussed with Patient and Family   The Patient understand(s) the diagnosis, prognosis and plan of care     Patient trained on compensatory strategies for safe swallow with good outcome. Patient and patient's wife encouraged to engage in question/answer session. All questions answered and patient verbalized understanding of above.         CPT code(s) [id number]  speech/language tx  Total minutes :  30 minutes

## 2022-03-23 ENCOUNTER — HOSPITAL ENCOUNTER (OUTPATIENT)
Dept: WOUND CARE | Age: 87
Discharge: HOME | End: 2022-03-23
Payer: MEDICARE

## 2022-03-23 VITALS
HEIGHT: 71 IN | WEIGHT: 145 LBS | HEART RATE: 64 BPM | RESPIRATION RATE: 18 BRPM | BODY MASS INDEX: 20.3 KG/M2 | TEMPERATURE: 96.6 F | DIASTOLIC BLOOD PRESSURE: 60 MMHG | SYSTOLIC BLOOD PRESSURE: 112 MMHG

## 2022-03-23 DIAGNOSIS — L89.212 PRESSURE INJURY OF RIGHT HIP, STAGE 2 (HCC): Primary | ICD-10-CM

## 2022-03-23 PROCEDURE — 11042 DBRDMT SUBQ TIS 1ST 20SQCM/<: CPT

## 2022-03-23 PROCEDURE — 11042 DBRDMT SUBQ TIS 1ST 20SQCM/<: CPT | Performed by: SURGERY

## 2022-03-23 PROCEDURE — 99213 OFFICE O/P EST LOW 20 MIN: CPT

## 2022-03-23 PROCEDURE — 99203 OFFICE O/P NEW LOW 30 MIN: CPT | Performed by: SURGERY

## 2022-03-23 RX ORDER — LIDOCAINE 40 MG/G
CREAM TOPICAL ONCE
Status: CANCELLED | OUTPATIENT
Start: 2022-03-23 | End: 2022-03-23

## 2022-03-23 RX ORDER — LIDOCAINE HYDROCHLORIDE 20 MG/ML
JELLY TOPICAL ONCE
Status: CANCELLED | OUTPATIENT
Start: 2022-03-23 | End: 2022-03-23

## 2022-03-23 RX ORDER — LIDOCAINE HYDROCHLORIDE 40 MG/ML
SOLUTION TOPICAL ONCE
Status: DISCONTINUED | OUTPATIENT
Start: 2022-03-23 | End: 2022-03-24 | Stop reason: HOSPADM

## 2022-03-23 RX ORDER — BETAMETHASONE DIPROPIONATE 0.05 %
OINTMENT (GRAM) TOPICAL ONCE
Status: CANCELLED | OUTPATIENT
Start: 2022-03-23 | End: 2022-03-23

## 2022-03-23 RX ORDER — GINSENG 100 MG
CAPSULE ORAL ONCE
Status: CANCELLED | OUTPATIENT
Start: 2022-03-23 | End: 2022-03-23

## 2022-03-23 RX ORDER — BACITRACIN ZINC AND POLYMYXIN B SULFATE 500; 1000 [USP'U]/G; [USP'U]/G
OINTMENT TOPICAL ONCE
Status: CANCELLED | OUTPATIENT
Start: 2022-03-23 | End: 2022-03-23

## 2022-03-23 RX ORDER — BACITRACIN, NEOMYCIN, POLYMYXIN B 400; 3.5; 5 [USP'U]/G; MG/G; [USP'U]/G
OINTMENT TOPICAL ONCE
Status: CANCELLED | OUTPATIENT
Start: 2022-03-23 | End: 2022-03-23

## 2022-03-23 RX ORDER — LIDOCAINE 50 MG/G
OINTMENT TOPICAL ONCE
Status: CANCELLED | OUTPATIENT
Start: 2022-03-23 | End: 2022-03-23

## 2022-03-23 RX ORDER — GENTAMICIN SULFATE 1 MG/G
OINTMENT TOPICAL ONCE
Status: CANCELLED | OUTPATIENT
Start: 2022-03-23 | End: 2022-03-23

## 2022-03-23 RX ORDER — LIDOCAINE HYDROCHLORIDE 40 MG/ML
SOLUTION TOPICAL ONCE
Status: CANCELLED | OUTPATIENT
Start: 2022-03-23 | End: 2022-03-23

## 2022-03-23 RX ORDER — CLOBETASOL PROPIONATE 0.5 MG/G
OINTMENT TOPICAL ONCE
Status: CANCELLED | OUTPATIENT
Start: 2022-03-23 | End: 2022-03-23

## 2022-03-23 ASSESSMENT — PAIN DESCRIPTION - LOCATION: LOCATION: COCCYX

## 2022-03-23 ASSESSMENT — PAIN - FUNCTIONAL ASSESSMENT: PAIN_FUNCTIONAL_ASSESSMENT: PREVENTS OR INTERFERES SOME ACTIVE ACTIVITIES AND ADLS

## 2022-03-23 ASSESSMENT — PAIN DESCRIPTION - ONSET: ONSET: ON-GOING

## 2022-03-23 ASSESSMENT — PAIN SCALES - GENERAL: PAINLEVEL_OUTOF10: 5

## 2022-03-23 ASSESSMENT — PAIN DESCRIPTION - FREQUENCY: FREQUENCY: INTERMITTENT

## 2022-03-23 ASSESSMENT — PAIN DESCRIPTION - PAIN TYPE: TYPE: ACUTE PAIN

## 2022-03-23 ASSESSMENT — PAIN DESCRIPTION - DESCRIPTORS: DESCRIPTORS: ACHING

## 2022-03-23 NOTE — PROGRESS NOTES
Wound Healing Center /Hyperbarics   History and Physical/Consultation  General Surgery/Medicine    Referring Physician : MD Gail Bangelly 42 RECORD NUMBER:  [id number]  AGE: 81 y.o. GENDER: male  : 1935  EPISODE DATE:  3/23/2022  Subjective:     Chief Complaint   Patient presents with    Wound Check     coccyx         HISTORY of PRESENT ILLNESS HPI     Blanca Best is a 81 y.o. male who presents today for wound/ulcer evaluation. History of Wound Context:  The patient has had a wound of right hip which was first noted approximately one year duration. This has been treated with topical creams at home. On their initial visit to the wound healing center ,  the patient has noted that the wound has not been improving. The patient has not had similar previous wounds in the past.      Pt is not on abx at time of initial visit.       Wound/Ulcer Pain Timing/Severity: intermittent  Quality of pain: dull  Severity:  2  10   Modifying Factors: Pain is relieved/improved with rest  Associated Signs/Symptoms: erythema    Ulcer Identification:  Ulcer Type: pressure  Contributing Factors: decreased mobility      PAST MEDICAL HISTORY      Diagnosis Date    Acute MI, inferior wall (HCC)     CAD (coronary artery disease)     Hyperlipidemia     Statin intolerance     myositis      Past Surgical History:   Procedure Laterality Date    CORONARY ANGIOPLASTY  10/31/10    S/P PCI with 5 x 16 mm bare metal stent     DIAGNOSTIC CARDIAC CATH LAB PROCEDURE  10/31/10    JOINT REPLACEMENT       Family History   Problem Relation Age of Onset    Heart Attack Mother      Social History     Tobacco Use    Smoking status: Never Smoker    Smokeless tobacco: Never Used   Vaping Use    Vaping Use: Never used   Substance Use Topics    Alcohol use: No    Drug use: No     No Known Allergies  Current Outpatient Medications on File Prior to Encounter   Medication Sig Dispense Refill    carbidopa-levodopa (SINEMET)  MG per tablet Take 1.5 pills three times per day (7 am, 12 pm, 5 pm)      methotrexate (RHEUMATREX) 2.5 MG chemo tablet Take by mouth once a week 5 tablets weekly      metoprolol succinate (TOPROL XL) 50 MG extended release tablet Take 50 mg by mouth daily      pravastatin (PRAVACHOL) 20 MG tablet Take 1 tablet by mouth daily 90 tablet 3    levothyroxine (SYNTHROID) 175 MCG tablet Take 150 mcg by mouth Daily       predniSONE (DELTASONE) 5 MG tablet Take 5 mg by mouth daily       NAPROXEN PO Take 220 mg by mouth as needed        No current facility-administered medications on file prior to encounter.        REVIEW OF SYSTEMS     ROS : All others Negative if blank [], Positive if [x]  General Urinary   [] Fevers [] Hematuria   [] Chills [] Dysuria   [] Weight Loss Neurolgoic   Skin [] Stroke/TIA   [] Tissue Loss [] Focal weakness   Eyes [] Slurred Speech   [] Wears Glasses/Contacts ENT   [] Vision Changes [] Difficulty swallowing   Respiratory Endocrine    [] Shortness of breath [] Increased Thirst   Cardiovascular Gastrointestinal   [] Chest Pain [] Abdominal Pain   [] Shortness of breath with exertion [] Melena    [] Hematochezia     Objective:    /60   Pulse 64   Temp 96.6 °F (35.9 °C) (Temporal)   Resp 18   Ht 5' 11\" (1.803 m)   Wt 145 lb (65.8 kg)   BMI 20.22 kg/m²   Wt Readings from Last 3 Encounters:   03/23/22 145 lb (65.8 kg)   08/16/21 163 lb 3.2 oz (74 kg)   08/12/20 176 lb (79.8 kg)       PHYSICAL EXAM  CONSTITUTIONAL:   Awake, alert, cooperative     PSYCHIATRIC :  Oriented to time, place and person        normal insight to disease process  ENT:  External ears and nose without lesions      Hearing deficit is not noted  NECK: Supple, symmetrical, trachea midline      Thyroid goiter not appreciated     LUNGS:  No increased work of breathing                    Clear to auscultation bilaterally     CARDIOVASCULAR:  regular rate and rhythm     ABDOMEN:  soft, non-distended, non-tender Hernias is not noted  Lymphatics : Cervical lymphadenopathy is not noted     Femoral lymphadenopathy is not noted  SKIN:   Skin color is normal   Texture and turgor is normal   Induration is not noted  EXTREMITIES:   R UE Edema is not noted  L UE Edema is not noted  R LE Edema is not noted  L LE Edema is not noted  Assessment:     Problem List Items Addressed This Visit     None          Pre Debridement Measurements:  Are located in the [place]  Documentation Flow Sheet  Post Debridement Measurements:  Wound/Ulcer Descriptions are Pre Debridement except measurements:     Wound 03/23/22 Buttocks Left #1 (Active)   Wound Image   03/23/22 1443   Wound Length (cm) 0.7 cm 03/23/22 1443   Wound Width (cm) 0.5 cm 03/23/22 1443   Wound Depth (cm) 0.1 cm 03/23/22 1443   Wound Surface Area (cm^2) 0.35 cm^2 03/23/22 1443   Wound Volume (cm^3) 0.035 cm^3 03/23/22 1443   Post-Procedure Length (cm) 0.7 cm 03/23/22 1518   Post-Procedure Width (cm) 0.5 cm 03/23/22 1518   Post-Procedure Depth (cm) 0.2 cm 03/23/22 1518   Post-Procedure Surface Area (cm^2) 0.35 cm^2 03/23/22 1518   Post-Procedure Volume (cm^3) 0.07 cm^3 03/23/22 1518   Wound Assessment Pink/red 03/23/22 1443   Drainage Amount None 03/23/22 1443   Odor None 03/23/22 1443   Brenda-wound Assessment Blanchable erythema; Intact 03/23/22 1443   Number of days: 0          Procedure Note  Indications:  Based on my examination of this patient's wound(s)/ulcer(s) today, debridement is required to promote healing and evaluate the wound base. Performed by: Maia Rangel MD    Consent obtained:  Yes    Time out taken:  Yes    Pain Control: Anesthetic  Anesthetic: 4% Lidocaine Liquid Topical     Debridement:Excisional Debridement    Using curette the wound(s)/ulcer(s) was/were sharply debrided down through and including the removal of dermis.         Devitalized Tissue Debrided:  biofilm, slough and necrotic/eschar to stimulate bleeding to promote healing, post debridement good bleeding base and wound edges noted    Wound/Ulcer #: 1    Percent of Wound/Ulcer Debrided: 100%    Total Surface Area Debrided:  <20 sq cm     Estimated Blood Loss:  Minimal  Hemostasis Achieved:  not needed    Procedural Pain:  2  / 10   Post Procedural Pain:  2 / 10     Response to treatment:  Well tolerated by patient. A culture was not done. Plan:     Pt is not a smoker   -    In my professional opinion and based off the information that is available at this time this patient has appropriate indication for HBO Therapy: No    Treatment Note please see attached Discharge Instructions    Written patient dismissal instructions given to patient and signed by patient or POA.            Electronically signed by Son Thompson MD on 3/23/2022 at 3:23 PM

## 2022-03-30 ENCOUNTER — HOSPITAL ENCOUNTER (OUTPATIENT)
Dept: WOUND CARE | Age: 87
Discharge: HOME | End: 2022-03-30
Payer: MEDICARE

## 2022-03-30 VITALS
SYSTOLIC BLOOD PRESSURE: 110 MMHG | WEIGHT: 145 LBS | TEMPERATURE: 96 F | RESPIRATION RATE: 18 BRPM | BODY MASS INDEX: 20.3 KG/M2 | HEIGHT: 71 IN | HEART RATE: 62 BPM | DIASTOLIC BLOOD PRESSURE: 66 MMHG

## 2022-03-30 DIAGNOSIS — L89.212 PRESSURE INJURY OF RIGHT HIP, STAGE 2 (HCC): Primary | ICD-10-CM

## 2022-03-30 PROCEDURE — 11042 DBRDMT SUBQ TIS 1ST 20SQCM/<: CPT

## 2022-03-30 PROCEDURE — 11042 DBRDMT SUBQ TIS 1ST 20SQCM/<: CPT | Performed by: SURGERY

## 2022-03-30 PROCEDURE — 6370000000 HC RX 637 (ALT 250 FOR IP): Performed by: SURGERY

## 2022-03-30 RX ORDER — LIDOCAINE 40 MG/G
CREAM TOPICAL ONCE
Status: CANCELLED | OUTPATIENT
Start: 2022-03-30 | End: 2022-03-30

## 2022-03-30 RX ORDER — LIDOCAINE HYDROCHLORIDE 40 MG/ML
SOLUTION TOPICAL ONCE
Status: COMPLETED | OUTPATIENT
Start: 2022-03-30 | End: 2022-03-30

## 2022-03-30 RX ORDER — BACITRACIN ZINC AND POLYMYXIN B SULFATE 500; 1000 [USP'U]/G; [USP'U]/G
OINTMENT TOPICAL ONCE
Status: CANCELLED | OUTPATIENT
Start: 2022-03-30 | End: 2022-03-30

## 2022-03-30 RX ORDER — GENTAMICIN SULFATE 1 MG/G
OINTMENT TOPICAL ONCE
Status: CANCELLED | OUTPATIENT
Start: 2022-03-30 | End: 2022-03-30

## 2022-03-30 RX ORDER — CLOBETASOL PROPIONATE 0.5 MG/G
OINTMENT TOPICAL ONCE
Status: CANCELLED | OUTPATIENT
Start: 2022-03-30 | End: 2022-03-30

## 2022-03-30 RX ORDER — LIDOCAINE 50 MG/G
OINTMENT TOPICAL ONCE
Status: CANCELLED | OUTPATIENT
Start: 2022-03-30 | End: 2022-03-30

## 2022-03-30 RX ORDER — GINSENG 100 MG
CAPSULE ORAL ONCE
Status: CANCELLED | OUTPATIENT
Start: 2022-03-30 | End: 2022-03-30

## 2022-03-30 RX ORDER — LIDOCAINE HYDROCHLORIDE 20 MG/ML
JELLY TOPICAL ONCE
Status: CANCELLED | OUTPATIENT
Start: 2022-03-30 | End: 2022-03-30

## 2022-03-30 RX ORDER — BACITRACIN, NEOMYCIN, POLYMYXIN B 400; 3.5; 5 [USP'U]/G; MG/G; [USP'U]/G
OINTMENT TOPICAL ONCE
Status: CANCELLED | OUTPATIENT
Start: 2022-03-30 | End: 2022-03-30

## 2022-03-30 RX ORDER — LIDOCAINE HYDROCHLORIDE 40 MG/ML
SOLUTION TOPICAL ONCE
Status: CANCELLED | OUTPATIENT
Start: 2022-03-30 | End: 2022-03-30

## 2022-03-30 RX ORDER — BETAMETHASONE DIPROPIONATE 0.05 %
OINTMENT (GRAM) TOPICAL ONCE
Status: CANCELLED | OUTPATIENT
Start: 2022-03-30 | End: 2022-03-30

## 2022-03-30 RX ADMIN — LIDOCAINE HYDROCHLORIDE 3 ML: 40 SOLUTION TOPICAL at 14:51

## 2022-03-30 ASSESSMENT — PAIN DESCRIPTION - LOCATION: LOCATION: COCCYX

## 2022-03-30 ASSESSMENT — PAIN DESCRIPTION - PAIN TYPE: TYPE: CHRONIC PAIN

## 2022-03-30 ASSESSMENT — PAIN DESCRIPTION - ONSET: ONSET: ON-GOING

## 2022-03-30 ASSESSMENT — PAIN SCALES - GENERAL: PAINLEVEL_OUTOF10: 5

## 2022-03-30 ASSESSMENT — PAIN DESCRIPTION - DESCRIPTORS: DESCRIPTORS: ACHING;SORE

## 2022-03-30 ASSESSMENT — PAIN DESCRIPTION - ORIENTATION: ORIENTATION: MID

## 2022-03-30 ASSESSMENT — PAIN - FUNCTIONAL ASSESSMENT: PAIN_FUNCTIONAL_ASSESSMENT: PREVENTS OR INTERFERES SOME ACTIVE ACTIVITIES AND ADLS

## 2022-03-30 ASSESSMENT — PAIN DESCRIPTION - PROGRESSION: CLINICAL_PROGRESSION: NOT CHANGED

## 2022-03-30 ASSESSMENT — PAIN DESCRIPTION - FREQUENCY: FREQUENCY: INTERMITTENT

## 2022-03-30 NOTE — PLAN OF CARE
Problem: Pain:  Goal: Pain level will decrease  Description: Pain level will decrease  Outcome: Met This Shift  Goal: Control of chronic pain  Description: Control of chronic pain  Outcome: Met This Shift     Problem: Pressure Ulcer:  Goal: Signs of wound healing will improve  Description: Signs of wound healing will improve  Outcome: Met This Shift  Goal: Absence of new pressure ulcer  Description: Absence of new pressure ulcer  Outcome: Met This Shift  Goal: Will show no infection signs and symptoms  Description: Will show no infection signs and symptoms  Outcome: Met This Shift

## 2022-03-30 NOTE — PROGRESS NOTES
Wound Care Center Follow-Up Progress Note    Darrell Salgado  AGE: 81 y.o. GENDER: male  : 1935    TODAY'S DATE:  3/30/2022    Subjective:    Darryl Rivero is a 81 y.o. male who presents today for follow-up examination and treatment of a delayed-healing wound(s). The patient denies any problems since last visit. Objective:    /66   Pulse 62   Temp 96 °F (35.6 °C) (Temporal)   Resp 18   Ht 5' 11\" (1.803 m)   Wt 145 lb (65.8 kg)   BMI 20.22 kg/m²     (Wound Reference Date is when first assessed.   Measurements shown are from today's visit.)    Wound 22 Buttocks Left #1 (Active)   Wound Image   22 1443   Dressing Status New dressing applied 22 1530   Wound Cleansed Cleansed with saline 22 1530   Dressing/Treatment Collagen with Ag;Dry dressing 22 1530   Offloading for Diabetic Foot Ulcers Other (comment) 22 1530   Wound Length (cm) 0.5 cm 22 1446   Wound Width (cm) 0.5 cm 22 1446   Wound Depth (cm) 0.1 cm 22 1446   Wound Surface Area (cm^2) 0.25 cm^2 22 144   Change in Wound Size % (l*w) 28.57 22 144   Wound Volume (cm^3) 0.025 cm^3 22 144   Wound Healing % 29 22 1446   Post-Procedure Length (cm) 0.7 cm 22 1518   Post-Procedure Width (cm) 0.5 cm 22 1518   Post-Procedure Depth (cm) 0.2 cm 22 1518   Post-Procedure Surface Area (cm^2) 0.35 cm^2 22 1518   Post-Procedure Volume (cm^3) 0.07 cm^3 22 1518   Wound Assessment Fibrin;Pale granulation tissue;Pink/red 22 1446   Drainage Amount None 22 1446   Odor None 22 144   Brenda-wound Assessment Blanchable erythema 22 1446   Number of days: 7        Other physical exam findings:        Assessment:     Patient Active Problem List   Diagnosis Code    History of MI (myocardial infarction) I25.2    CAD (coronary artery disease) I25.10    Hyperlipidemia E78.5    Statin intolerance - myositis Z78.9    Bradycardia R00.1    Hypertriglyceridemia E78.1    Hx of myocardial infarction I25.2    Stented coronary artery Z95.5    Pressure injury of right hip, stage 2 ([facility]) 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 Size has decreased. Appearance has improved. Stage 2     (Please refer to nursing measurements and assessment regarding wound measurements.) Wound check - Care provided includes removal of existing dressing and visual inspection. Plan:       1. 100%Debridement today. See Procedure Note below. 2. Discussed appropriate home care of this wound. Dispensed dressing supplies and instructions on their use. Wound redressed. 3. Patient instructions were given. Dressing: collagen Offloading. 4. Follow up: 2 week. Henrik Simpson MD      [address] [facility] Procedure Note    Darrell Salgado  AGE: 81 y.o. GENDER: male  : 1935    TODAY'S DATE:  3/30/2022    The patient was identified and the procedure was confirmed. Lidocaine gel soaked gauze was applied at beginning of wound evaluation. The left hip wound was excisionally debrided sharply of fibrotic, necrotic, and hyperkeratotic tissue, including a layer of surrounding healthy tissue using curette for a total surface area of < 20 cm2. The wound(s) was debrided down through and including full thickness tissue. 100% of the wound was debrided. There was minimal bleeding that was controlled with pressure. Patient tolerated procedure well and was given proper instruction.       Henrik Simpson MD

## 2022-04-13 ENCOUNTER — HOSPITAL ENCOUNTER (OUTPATIENT)
Dept: WOUND CARE | Age: 87
Discharge: HOME | End: 2022-04-13
Payer: MEDICARE

## 2022-04-13 VITALS
BODY MASS INDEX: 20.3 KG/M2 | TEMPERATURE: 97.1 F | HEART RATE: 82 BPM | SYSTOLIC BLOOD PRESSURE: 118 MMHG | WEIGHT: 145 LBS | DIASTOLIC BLOOD PRESSURE: 64 MMHG | HEIGHT: 71 IN

## 2022-04-13 DIAGNOSIS — L89.212 PRESSURE INJURY OF RIGHT HIP, STAGE 2 (HCC): Primary | ICD-10-CM

## 2022-04-13 PROCEDURE — 6370000000 HC RX 637 (ALT 250 FOR IP): Performed by: SURGERY

## 2022-04-13 PROCEDURE — 11042 DBRDMT SUBQ TIS 1ST 20SQCM/<: CPT | Performed by: SURGERY

## 2022-04-13 PROCEDURE — 11042 DBRDMT SUBQ TIS 1ST 20SQCM/<: CPT

## 2022-04-13 RX ORDER — GINSENG 100 MG
CAPSULE ORAL ONCE
Status: CANCELLED | OUTPATIENT
Start: 2022-04-13 | End: 2022-04-13

## 2022-04-13 RX ORDER — BETAMETHASONE DIPROPIONATE 0.05 %
OINTMENT (GRAM) TOPICAL ONCE
Status: CANCELLED | OUTPATIENT
Start: 2022-04-13 | End: 2022-04-13

## 2022-04-13 RX ORDER — BACITRACIN ZINC AND POLYMYXIN B SULFATE 500; 1000 [USP'U]/G; [USP'U]/G
OINTMENT TOPICAL ONCE
Status: CANCELLED | OUTPATIENT
Start: 2022-04-13 | End: 2022-04-13

## 2022-04-13 RX ORDER — LIDOCAINE 40 MG/G
CREAM TOPICAL ONCE
Status: CANCELLED | OUTPATIENT
Start: 2022-04-13 | End: 2022-04-13

## 2022-04-13 RX ORDER — LIDOCAINE HYDROCHLORIDE 40 MG/ML
SOLUTION TOPICAL ONCE
Status: CANCELLED | OUTPATIENT
Start: 2022-04-13 | End: 2022-04-13

## 2022-04-13 RX ORDER — LIDOCAINE HYDROCHLORIDE 40 MG/ML
SOLUTION TOPICAL ONCE
Status: COMPLETED | OUTPATIENT
Start: 2022-04-13 | End: 2022-04-13

## 2022-04-13 RX ORDER — CLOBETASOL PROPIONATE 0.5 MG/G
OINTMENT TOPICAL ONCE
Status: CANCELLED | OUTPATIENT
Start: 2022-04-13 | End: 2022-04-13

## 2022-04-13 RX ORDER — BACITRACIN, NEOMYCIN, POLYMYXIN B 400; 3.5; 5 [USP'U]/G; MG/G; [USP'U]/G
OINTMENT TOPICAL ONCE
Status: CANCELLED | OUTPATIENT
Start: 2022-04-13 | End: 2022-04-13

## 2022-04-13 RX ORDER — LIDOCAINE 50 MG/G
OINTMENT TOPICAL ONCE
Status: CANCELLED | OUTPATIENT
Start: 2022-04-13 | End: 2022-04-13

## 2022-04-13 RX ORDER — LIDOCAINE HYDROCHLORIDE 20 MG/ML
JELLY TOPICAL ONCE
Status: CANCELLED | OUTPATIENT
Start: 2022-04-13 | End: 2022-04-13

## 2022-04-13 RX ORDER — GENTAMICIN SULFATE 1 MG/G
OINTMENT TOPICAL ONCE
Status: CANCELLED | OUTPATIENT
Start: 2022-04-13 | End: 2022-04-13

## 2022-04-13 RX ADMIN — LIDOCAINE HYDROCHLORIDE 5 ML: 40 SOLUTION TOPICAL at 15:11

## 2022-04-13 ASSESSMENT — PAIN - FUNCTIONAL ASSESSMENT: PAIN_FUNCTIONAL_ASSESSMENT: PREVENTS OR INTERFERES SOME ACTIVE ACTIVITIES AND ADLS

## 2022-04-13 ASSESSMENT — PAIN DESCRIPTION - PROGRESSION: CLINICAL_PROGRESSION: NOT CHANGED

## 2022-04-13 ASSESSMENT — PAIN DESCRIPTION - FREQUENCY: FREQUENCY: INTERMITTENT

## 2022-04-13 ASSESSMENT — PAIN SCALES - GENERAL: PAINLEVEL_OUTOF10: 10

## 2022-04-13 ASSESSMENT — PAIN DESCRIPTION - DESCRIPTORS: DESCRIPTORS: ACHING

## 2022-04-13 ASSESSMENT — PAIN DESCRIPTION - ONSET: ONSET: ON-GOING

## 2022-04-13 ASSESSMENT — PAIN DESCRIPTION - PAIN TYPE: TYPE: CHRONIC PAIN

## 2022-04-13 NOTE — PROGRESS NOTES
Wound Care Center Follow-Up Progress Note    Darrell Salgado  AGE: 81 y.o. GENDER: male  : 1935    TODAY'S DATE:  2022    Subjective:    Yasmani Lopez is a 81 y.o. male who presents today for follow-up examination and treatment of a delayed-healing wound(s). The patient denies any problems since last visit. Objective:    /64   Pulse 82   Temp 97.1 °F (36.2 °C) (Temporal)   Ht 5' 11\" (1.803 m)   Wt 145 lb (65.8 kg)   BMI 20.22 kg/m²     (Wound Reference Date is when first assessed.   Measurements shown are from today's visit.)    Wound 22 Buttocks Left #1 (Active)   Wound Image   22 1443   Dressing Status New dressing applied 22 1530   Wound Cleansed Cleansed with saline 22 1530   Dressing/Treatment Collagen with Ag;Dry dressing 22 1530   Offloading for Diabetic Foot Ulcers Other (comment) 22 1530   Wound Length (cm) 0.7 cm 22 1510   Wound Width (cm) 0.7 cm 22 1510   Wound Depth (cm) 0.2 cm 22 1510   Wound Surface Area (cm^2) 0.49 cm^2 22 1510   Change in Wound Size % (l*w) -40 22 1510   Wound Volume (cm^3) 0.098 cm^3 22 1510   Wound Healing % -180 22 1510   Post-Procedure Length (cm) 0.5 cm 22 1541   Post-Procedure Width (cm) 0.5 cm 22 1541   Post-Procedure Depth (cm) 0.1 cm 22 1541   Post-Procedure Surface Area (cm^2) 0.25 cm^2 22 1541   Post-Procedure Volume (cm^3) 0.025 cm^3 22 1541   Wound Assessment Fibrin 22 1510   Drainage Amount Scant 22 1510   Drainage Description Yellow 22 1510   Odor None 22 1510   Brenda-wound Assessment Fragile 22 1510   Number of days: 21        Other physical exam findings:        Assessment:     Patient Active Problem List   Diagnosis Code    History of MI (myocardial infarction) I25.2    CAD (coronary artery disease) I25.10    Hyperlipidemia E78.5    Statin intolerance - myositis Z78.9    Bradycardia R00.1    Hypertriglyceridemia E78.1    Hx of myocardial infarction I25.2    Stented coronary artery Z95.5    Pressure injury of right hip, stage 2 ([facility]) 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 Size has unchanged. Appearance has improved. Stage 2     (Please refer to nursing measurements and assessment regarding wound measurements.) Wound check - Care provided includes removal of existing dressing and visual inspection. Plan:       1. 100%Debridement today. See Procedure Note below. 2. Discussed appropriate home care of this wound. Dispensed dressing supplies and instructions on their use. Wound redressed. 3. Patient instructions were given. Dressing: Barry Rang. 4. Follow up: 1 week. Estella Tan MD      215 [facility] Procedure Note    Darrell Salgado  AGE: 81 y.o. GENDER: male  : 1935    TODAY'S DATE:  2022    The patient was identified and the procedure was confirmed. Lidocaine gel soaked gauze was applied at beginning of wound evaluation. The left buttock wound was excisionally debrided sharply of fibrotic, necrotic, and hyperkeratotic tissue, including a layer of surrounding healthy tissue using curette for a total surface area of < 20 cm2. The wound(s) was debrided down through and including full thickness tissue. 100% of the wound was debrided. There was minimal bleeding that was controlled with pressure. Patient tolerated procedure well and was given proper instruction.       Estella Tan MD

## 2022-04-13 NOTE — PLAN OF CARE
Problem: Pressure Ulcer:  Goal: Signs of wound healing will improve  Description: Signs of wound healing will improve  Outcome: Met This Shift  Goal: Absence of new pressure ulcer  Description: Absence of new pressure ulcer  Outcome: Met This Shift  Goal: Will show no infection signs and symptoms  Description: Will show no infection signs and symptoms  Outcome: Met This Shift

## 2022-04-20 ENCOUNTER — HOSPITAL ENCOUNTER (OUTPATIENT)
Dept: WOUND CARE | Age: 87
Discharge: HOME | End: 2022-04-20
Payer: MEDICARE

## 2022-04-20 VITALS
HEART RATE: 82 BPM | BODY MASS INDEX: 20.3 KG/M2 | RESPIRATION RATE: 18 BRPM | WEIGHT: 145 LBS | SYSTOLIC BLOOD PRESSURE: 118 MMHG | DIASTOLIC BLOOD PRESSURE: 78 MMHG | HEIGHT: 71 IN | TEMPERATURE: 95.5 F

## 2022-04-20 DIAGNOSIS — L89.212 PRESSURE INJURY OF RIGHT HIP, STAGE 2 (HCC): Primary | ICD-10-CM

## 2022-04-20 PROCEDURE — 6370000000 HC RX 637 (ALT 250 FOR IP): Performed by: SURGERY

## 2022-04-20 RX ORDER — BACITRACIN ZINC AND POLYMYXIN B SULFATE 500; 1000 [USP'U]/G; [USP'U]/G
OINTMENT TOPICAL ONCE
Status: CANCELLED | OUTPATIENT
Start: 2022-04-20 | End: 2022-04-20

## 2022-04-20 RX ORDER — LIDOCAINE 40 MG/G
CREAM TOPICAL ONCE
Status: CANCELLED | OUTPATIENT
Start: 2022-04-20 | End: 2022-04-20

## 2022-04-20 RX ORDER — BETAMETHASONE DIPROPIONATE 0.05 %
OINTMENT (GRAM) TOPICAL ONCE
Status: CANCELLED | OUTPATIENT
Start: 2022-04-20 | End: 2022-04-20

## 2022-04-20 RX ORDER — BACITRACIN, NEOMYCIN, POLYMYXIN B 400; 3.5; 5 [USP'U]/G; MG/G; [USP'U]/G
OINTMENT TOPICAL ONCE
Status: CANCELLED | OUTPATIENT
Start: 2022-04-20 | End: 2022-04-20

## 2022-04-20 RX ORDER — LIDOCAINE HYDROCHLORIDE 40 MG/ML
SOLUTION TOPICAL ONCE
Status: CANCELLED | OUTPATIENT
Start: 2022-04-20 | End: 2022-04-20

## 2022-04-20 RX ORDER — LIDOCAINE HYDROCHLORIDE 20 MG/ML
JELLY TOPICAL ONCE
Status: CANCELLED | OUTPATIENT
Start: 2022-04-20 | End: 2022-04-20

## 2022-04-20 RX ORDER — GENTAMICIN SULFATE 1 MG/G
OINTMENT TOPICAL ONCE
Status: CANCELLED | OUTPATIENT
Start: 2022-04-20 | End: 2022-04-20

## 2022-04-20 RX ORDER — LIDOCAINE HYDROCHLORIDE 40 MG/ML
SOLUTION TOPICAL ONCE
Status: COMPLETED | OUTPATIENT
Start: 2022-04-20 | End: 2022-04-20

## 2022-04-20 RX ORDER — LIDOCAINE 50 MG/G
OINTMENT TOPICAL ONCE
Status: CANCELLED | OUTPATIENT
Start: 2022-04-20 | End: 2022-04-20

## 2022-04-20 RX ORDER — GINSENG 100 MG
CAPSULE ORAL ONCE
Status: CANCELLED | OUTPATIENT
Start: 2022-04-20 | End: 2022-04-20

## 2022-04-20 RX ORDER — CLOBETASOL PROPIONATE 0.5 MG/G
OINTMENT TOPICAL ONCE
Status: CANCELLED | OUTPATIENT
Start: 2022-04-20 | End: 2022-04-20

## 2022-04-20 RX ADMIN — LIDOCAINE HYDROCHLORIDE 10 ML: 40 SOLUTION TOPICAL at 16:00

## 2022-04-20 ASSESSMENT — PAIN SCALES - GENERAL: PAINLEVEL_OUTOF10: 8

## 2022-04-20 ASSESSMENT — PAIN DESCRIPTION - PAIN TYPE: TYPE: CHRONIC PAIN

## 2022-04-20 ASSESSMENT — PAIN DESCRIPTION - ONSET: ONSET: ON-GOING

## 2022-04-20 ASSESSMENT — PAIN DESCRIPTION - DESCRIPTORS: DESCRIPTORS: ACHING

## 2022-04-20 ASSESSMENT — PAIN DESCRIPTION - LOCATION: LOCATION: BUTTOCKS

## 2022-04-20 ASSESSMENT — PAIN DESCRIPTION - ORIENTATION: ORIENTATION: MID

## 2022-04-20 ASSESSMENT — PAIN DESCRIPTION - FREQUENCY: FREQUENCY: INTERMITTENT

## 2022-04-20 NOTE — PLAN OF CARE
Problem: Skin/Tissue Integrity  Goal: Absence of new skin breakdown  Description: 1. Monitor for areas of redness and/or skin breakdown  2. Assess vascular access sites hourly  3. Every 4-6 hours minimum:  Change oxygen saturation probe site  4. Every 4-6 hours:  If on nasal continuous positive airway pressure, respiratory therapy assess nares and determine need for appliance change or resting period.   Outcome: Progressing     Problem: Pain  Goal: Verbalizes/displays adequate comfort level or baseline comfort level  Outcome: Progressing     Problem: Pain:  Goal: Pain level will decrease  Description: Pain level will decrease  Outcome: Progressing  Goal: Control of acute pain  Description: Control of acute pain  Outcome: Progressing  Goal: Control of chronic pain  Description: Control of chronic pain  Outcome: Progressing     Problem: Pressure Ulcer:  Goal: Signs of wound healing will improve  Description: Signs of wound healing will improve  Outcome: Progressing  Goal: Absence of new pressure ulcer  Description: Absence of new pressure ulcer  Outcome: Progressing  Goal: Will show no infection signs and symptoms  Description: Will show no infection signs and symptoms  Outcome: Progressing
symptoms  4/20/2022 1703 by Yogesh Ibarra, RN  Outcome: Progressing  4/20/2022 1616 by Yogesh Ibarra RN  Outcome: Progressing

## 2022-05-03 ENCOUNTER — OFFICE VISIT (OUTPATIENT)
Dept: CARDIOLOGY CLINIC | Age: 87
End: 2022-05-03
Payer: MEDICARE

## 2022-05-03 VITALS
SYSTOLIC BLOOD PRESSURE: 112 MMHG | HEIGHT: 70 IN | DIASTOLIC BLOOD PRESSURE: 72 MMHG | WEIGHT: 137 LBS | BODY MASS INDEX: 19.61 KG/M2 | HEART RATE: 87 BPM | RESPIRATION RATE: 18 BRPM

## 2022-05-03 DIAGNOSIS — E78.1 HYPERTRIGLYCERIDEMIA: ICD-10-CM

## 2022-05-03 DIAGNOSIS — I25.2 HX OF MYOCARDIAL INFARCTION: ICD-10-CM

## 2022-05-03 DIAGNOSIS — Z78.9 STATIN INTOLERANCE: ICD-10-CM

## 2022-05-03 DIAGNOSIS — I25.10 CORONARY ARTERY DISEASE INVOLVING NATIVE CORONARY ARTERY OF NATIVE HEART WITHOUT ANGINA PECTORIS: Primary | ICD-10-CM

## 2022-05-03 PROCEDURE — 4040F PNEUMOC VAC/ADMIN/RCVD: CPT | Performed by: INTERNAL MEDICINE

## 2022-05-03 PROCEDURE — G8420 CALC BMI NORM PARAMETERS: HCPCS | Performed by: INTERNAL MEDICINE

## 2022-05-03 PROCEDURE — 93000 ELECTROCARDIOGRAM COMPLETE: CPT | Performed by: INTERNAL MEDICINE

## 2022-05-03 PROCEDURE — G8427 DOCREV CUR MEDS BY ELIG CLIN: HCPCS | Performed by: INTERNAL MEDICINE

## 2022-05-03 PROCEDURE — 99214 OFFICE O/P EST MOD 30 MIN: CPT | Performed by: INTERNAL MEDICINE

## 2022-05-03 PROCEDURE — 1036F TOBACCO NON-USER: CPT | Performed by: INTERNAL MEDICINE

## 2022-05-03 PROCEDURE — 1123F ACP DISCUSS/DSCN MKR DOCD: CPT | Performed by: INTERNAL MEDICINE

## 2022-05-03 RX ORDER — METOPROLOL SUCCINATE 25 MG/1
25 TABLET, EXTENDED RELEASE ORAL DAILY
Qty: 90 TABLET | Refills: 3 | Status: SHIPPED | OUTPATIENT
Start: 2022-05-03

## 2022-05-03 RX ORDER — LEVOTHYROXINE SODIUM 0.15 MG/1
TABLET ORAL
COMMUNITY
Start: 2022-03-18

## 2022-05-03 RX ORDER — METOPROLOL SUCCINATE 25 MG/1
25 TABLET, EXTENDED RELEASE ORAL DAILY
COMMUNITY
End: 2022-05-03 | Stop reason: SDUPTHER

## 2022-05-03 NOTE — PROGRESS NOTES
Patient Active Problem List   Diagnosis    History of MI (myocardial infarction)    CAD (coronary artery disease)    Hyperlipidemia    Statin intolerance - myositis    Bradycardia    Hypertriglyceridemia    Hx of myocardial infarction    Stented coronary artery    Pressure injury of right hip, stage 2 (HCC)       Current Outpatient Medications   Medication Sig Dispense Refill    levothyroxine (SYNTHROID) 150 MCG tablet TAKE ONE TABLET BY MOUTH DAILY IN THE MORNING      metFORMIN (GLUCOPHAGE) 500 MG tablet TAKE ONE TABLET BY MOUTH EVERY DAY      metoprolol succinate (TOPROL XL) 25 MG extended release tablet Take 1 tablet by mouth daily 90 tablet 3    carbidopa-levodopa (SINEMET)  MG per tablet 2 tablets 2 tablets 3 times per day      methotrexate (RHEUMATREX) 2.5 MG chemo tablet Take by mouth once a week 5 tablets weekly      pravastatin (PRAVACHOL) 20 MG tablet Take 1 tablet by mouth daily 90 tablet 3    predniSONE (DELTASONE) 5 MG tablet Take 5 mg by mouth daily       NAPROXEN PO Take 220 mg by mouth as needed        No current facility-administered medications for this visit. CC:    Patient is seen for new problem of lightheadedness and fatigue. Also in follow up for:  1. Coronary artery disease involving native coronary artery of native heart without angina pectoris    2. Hx of myocardial infarction    3. Statin intolerance    4. Hypertriglyceridemia        HPI:  Difficulties with lightheadedness and fatigue. Some episodes of near syncope. Pressures have been running on the low side. Also, some sharp brief episodes of chest discomfort. Not related to exertion. No shortness of breath.     ROS:   General: No unusual weight gain, change in exercise tolerance  Skin: No rash or itching  EENT: No vision changes or nosebleeds  Cardiovascular: No orthopnea or paroxysmal nocturnal dyspnea  Respiratory: No cough or hemoptysis  Gastrointestinal: No hematemesis or recent changes in bowel habits  Genitourinary: No hematuria, urgency or frequency  Musculoskeletal: Weakness due to Parkinson's. Neurologic / Psychiatric: No incoordination or convulsions  Allergic / Immunologic/ Lymphatic / Endocrine: No anemia or bleeding tendency     Social History     Socioeconomic History    Marital status:      Spouse name: Not on file    Number of children: Not on file    Years of education: Not on file    Highest education level: Not on file   Occupational History    Not on file   Tobacco Use    Smoking status: Never Smoker    Smokeless tobacco: Never Used   Vaping Use    Vaping Use: Never used   Substance and Sexual Activity    Alcohol use: No    Drug use: No    Sexual activity: Not on file   Other Topics Concern    Not on file   Social History Narrative    Not on file     Social Determinants of Health     Financial Resource Strain:     Difficulty of Paying Living Expenses: Not on file   Food Insecurity:     Worried About Running Out of Food in the Last Year: Not on file    Rory of Food in the Last Year: Not on file   Transportation Needs:     Lack of Transportation (Medical): Not on file    Lack of Transportation (Non-Medical):  Not on file   Physical Activity:     Days of Exercise per Week: Not on file    Minutes of Exercise per Session: Not on file   Stress:     Feeling of Stress : Not on file   Social Connections:     Frequency of Communication with Friends and Family: Not on file    Frequency of Social Gatherings with Friends and Family: Not on file    Attends [employer] Services: Not on file    Active Member of Clubs or Organizations: Not on file    Attends Club or Organization Meetings: Not on file    Marital Status: Not on file   Intimate Partner Violence:     Fear of Current or Ex-Partner: Not on file    Emotionally Abused: Not on file    Physically Abused: Not on file    Sexually Abused: Not on file   Housing Stability:     Unable to Pay for Housing in the Last Year: Not on file    Number of Places Lived in the Last Year: Not on file    Unstable Housing in the Last Year: Not on file       CONSTITUTIONAL:  Well developed, well nourished    Vitals:    05/03/22 1047   BP: 112/72   Pulse: 87   Resp: 18   Weight: 137 lb (62.1 kg)   Height: 5' 10\" (1.778 m)     HEAD & FACE: Normocephalic. Symmetric. EYES: No xanthelasma. Conjunctivae not injected. EARS, NOSE, MOUTH & THROAT: Good dentition. No oral pallor or cyanosis. NECK: No JVD at 30 degrees. No thyromegaly. RESPIRATORY: Clear to auscultation and percussion in all fields. No use of accessory muscle or intercostal retractions. CARDIOVASCULAR: Regular rhythm bradycardia. No lifts or thrills on palpitation. Auscultation with normal S1-S2 in intensity and splitting. No carotid bruits. Abdominal aorta not enlarged. Femoral arteries without bruits. Pedal pulses 2+. No edema. ABDOMEN: Soft without hepatic or splenic enlargement. No tenderness. MUSCULOSKELETAL: No kyphosis or scoliosis of the back. Good muscle strength and tone. No muscle atrophy. Slow gait and inability to undergo exercise stress testing. EXTREMITIES: No clubbing or cyanosis. SKIN: No Xanthomas or ulcerations. NEUROLOGIC: Oriented to time, place and person. Normal mood and affect. LYMPHATIC:  No palpable neck or supraclavicular nodes. No splenomegaly. EKG: Rene sinus rhythm. No change compared to prior tracing. QTc 385 ms. ASSESSMENT:                                                     ORDERS:       Diagnosis Orders   1. Coronary artery disease involving native coronary artery of native heart without angina pectoris  EKG 12 lead   2. Hx of myocardial infarction     3. Statin intolerance     4. Hypertriglyceridemia     Atypical chest pain. Probable side effects to Toprol. PLAN:   See above orders.  Reviewed prior records and testing:   Reviewed recent MRI with microvascular changes  Labs: Cr .68  Decrease Toprol to 25 mg daily. Discussed issues that would prompt earlier evaluation.      Follow-up office visit in 1/2 year

## 2022-05-04 ENCOUNTER — HOSPITAL ENCOUNTER (OUTPATIENT)
Dept: WOUND CARE | Age: 87
Discharge: HOME | End: 2022-05-04
Payer: MEDICARE

## 2022-05-04 VITALS
RESPIRATION RATE: 18 BRPM | DIASTOLIC BLOOD PRESSURE: 62 MMHG | SYSTOLIC BLOOD PRESSURE: 138 MMHG | HEART RATE: 99 BPM | TEMPERATURE: 96.5 F

## 2022-05-04 DIAGNOSIS — L89.212 PRESSURE INJURY OF RIGHT HIP, STAGE 2 (HCC): Primary | ICD-10-CM

## 2022-05-04 PROCEDURE — 6370000000 HC RX 637 (ALT 250 FOR IP): Performed by: SURGERY

## 2022-05-04 PROCEDURE — 11042 DBRDMT SUBQ TIS 1ST 20SQCM/<: CPT | Performed by: SURGERY

## 2022-05-04 PROCEDURE — 11042 DBRDMT SUBQ TIS 1ST 20SQCM/<: CPT

## 2022-05-04 RX ORDER — BACITRACIN, NEOMYCIN, POLYMYXIN B 400; 3.5; 5 [USP'U]/G; MG/G; [USP'U]/G
OINTMENT TOPICAL ONCE
Status: CANCELLED | OUTPATIENT
Start: 2022-05-04 | End: 2022-05-04

## 2022-05-04 RX ORDER — LIDOCAINE 50 MG/G
OINTMENT TOPICAL ONCE
Status: CANCELLED | OUTPATIENT
Start: 2022-05-04 | End: 2022-05-04

## 2022-05-04 RX ORDER — LIDOCAINE HYDROCHLORIDE 40 MG/ML
SOLUTION TOPICAL ONCE
Status: CANCELLED | OUTPATIENT
Start: 2022-05-04 | End: 2022-05-04

## 2022-05-04 RX ORDER — LIDOCAINE HYDROCHLORIDE 40 MG/ML
SOLUTION TOPICAL ONCE
Status: COMPLETED | OUTPATIENT
Start: 2022-05-04 | End: 2022-05-04

## 2022-05-04 RX ORDER — BETAMETHASONE DIPROPIONATE 0.05 %
OINTMENT (GRAM) TOPICAL ONCE
Status: CANCELLED | OUTPATIENT
Start: 2022-05-04 | End: 2022-05-04

## 2022-05-04 RX ORDER — BACITRACIN ZINC AND POLYMYXIN B SULFATE 500; 1000 [USP'U]/G; [USP'U]/G
OINTMENT TOPICAL ONCE
Status: CANCELLED | OUTPATIENT
Start: 2022-05-04 | End: 2022-05-04

## 2022-05-04 RX ORDER — GINSENG 100 MG
CAPSULE ORAL ONCE
Status: CANCELLED | OUTPATIENT
Start: 2022-05-04 | End: 2022-05-04

## 2022-05-04 RX ORDER — LIDOCAINE 40 MG/G
CREAM TOPICAL ONCE
Status: CANCELLED | OUTPATIENT
Start: 2022-05-04 | End: 2022-05-04

## 2022-05-04 RX ORDER — GENTAMICIN SULFATE 1 MG/G
OINTMENT TOPICAL ONCE
Status: CANCELLED | OUTPATIENT
Start: 2022-05-04 | End: 2022-05-04

## 2022-05-04 RX ORDER — CLOBETASOL PROPIONATE 0.5 MG/G
OINTMENT TOPICAL ONCE
Status: CANCELLED | OUTPATIENT
Start: 2022-05-04 | End: 2022-05-04

## 2022-05-04 RX ORDER — LIDOCAINE HYDROCHLORIDE 20 MG/ML
JELLY TOPICAL ONCE
Status: CANCELLED | OUTPATIENT
Start: 2022-05-04 | End: 2022-05-04

## 2022-05-04 RX ADMIN — LIDOCAINE HYDROCHLORIDE: 40 SOLUTION TOPICAL at 14:52

## 2022-05-04 ASSESSMENT — PAIN DESCRIPTION - FREQUENCY: FREQUENCY: INTERMITTENT

## 2022-05-04 ASSESSMENT — PAIN - FUNCTIONAL ASSESSMENT: PAIN_FUNCTIONAL_ASSESSMENT: PREVENTS OR INTERFERES SOME ACTIVE ACTIVITIES AND ADLS

## 2022-05-04 ASSESSMENT — PAIN DESCRIPTION - PAIN TYPE: TYPE: CHRONIC PAIN

## 2022-05-04 ASSESSMENT — PAIN SCALES - GENERAL: PAINLEVEL_OUTOF10: 5

## 2022-05-04 ASSESSMENT — PAIN DESCRIPTION - ONSET: ONSET: ON-GOING

## 2022-05-04 ASSESSMENT — PAIN DESCRIPTION - LOCATION: LOCATION: BUTTOCKS

## 2022-05-04 ASSESSMENT — PAIN DESCRIPTION - DESCRIPTORS: DESCRIPTORS: ACHING

## 2022-05-04 ASSESSMENT — PAIN DESCRIPTION - ORIENTATION: ORIENTATION: MID

## 2022-05-04 NOTE — PROGRESS NOTES
Wound Care Center Follow-Up Progress Note    Darrell Salgado  AGE: 81 y.o. GENDER: male  : 1935    TODAY'S DATE:  2022    Subjective:    Anna Rios is a 81 y.o. male who presents today for follow-up examination and treatment of a delayed-healing wound(s). The patient denies any problems since last visit. Objective:    /62   Pulse 99   Temp 96.5 °F (35.8 °C) (Temporal)   Resp 18     (Wound Reference Date is when first assessed.   Measurements shown are from today's visit.)    Wound 22 Buttocks Left #1 (Active)   Wound Image   22 1600   Dressing Status New dressing applied 22 162   Wound Cleansed Cleansed with saline 22 162   Dressing/Treatment Collagen with Ag;Dry dressing 22 162   Offloading for Diabetic Foot Ulcers Other (comment) 22 1530   Wound Length (cm) 0.3 cm 22 1452   Wound Width (cm) 0.3 cm 22 1452   Wound Depth (cm) 0.1 cm 22 1452   Wound Surface Area (cm^2) 0.09 cm^2 22 145   Change in Wound Size % (l*w) 74.29 22 145   Wound Volume (cm^3) 0.009 cm^3 22 145   Wound Healing % 74 22 1452   Post-Procedure Length (cm) 0.3 cm 22 1517   Post-Procedure Width (cm) 0.3 cm 22 1517   Post-Procedure Depth (cm) 0.1 cm 22 1517   Post-Procedure Surface Area (cm^2) 0.09 cm^2 22 1517   Post-Procedure Volume (cm^3) 0.009 cm^3 22 1517   Wound Assessment Pink/red 22 145   Drainage Amount Scant 22 145   Drainage Description Yellow;Serosanguinous 22 1452   Odor None 22 145   Brenda-wound Assessment Intact 22 145   Number of days: 42        Other physical exam findings:        Assessment:     Patient Active Problem List   Diagnosis Code    History of MI (myocardial infarction) I25.2    CAD (coronary artery disease) I25.10    Hyperlipidemia E78.5    Statin intolerance - myositis Z78.9    Bradycardia R00.1    Hypertriglyceridemia E78.1    Hx of myocardial infarction I25.2    Stented coronary artery Z95.5    Pressure injury of right hip, stage 2 ([facility]) 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 Size has decreased. Appearance has improved. Stage 2     (Please refer to nursing measurements and assessment regarding wound measurements.) Wound check - Care provided includes removal of existing dressing and visual inspection. Plan:       1. 100%Debridement today. See Procedure Note below. 2. Discussed appropriate home care of this wound. Dispensed dressing supplies and instructions on their use. Wound redressed. 3. Patient instructions were given. Dressing: Lexie Innocent. 4. Follow up: 1 week. Magnus Ramirez MD      [facility] Procedure Note    Darrell Salgado  AGE: 81 y.o. GENDER: male  : 1935    TODAY'S DATE:  2022    The patient was identified and the procedure was confirmed. Lidocaine gel soaked gauze was applied at beginning of wound evaluation. The left buttock wound was excisionally debrided sharply of fibrotic, necrotic, and hyperkeratotic tissue, including a layer of surrounding healthy tissue using curette for a total surface area of < 20 cm2. The wound(s) was debrided down through and including full thickness tissue. 100% of the wound was debrided. There was minimal bleeding that was controlled with pressure. Patient tolerated procedure well and was given proper instruction.       Magnus Ramirez MD

## 2022-05-04 NOTE — PLAN OF CARE
Problem: Pain  Goal: Verbalizes/displays adequate comfort level or baseline comfort level  Outcome: Progressing     Problem: Skin/Tissue Integrity  Goal: Absence of new skin breakdown  Description: 1. Monitor for areas of redness and/or skin breakdown  2. Assess vascular access sites hourly  3. Every 4-6 hours minimum:  Change oxygen saturation probe site  4. Every 4-6 hours:  If on nasal continuous positive airway pressure, respiratory therapy assess nares and determine need for appliance change or resting period.   Outcome: Progressing     Problem: Pressure Ulcer:  Goal: Signs of wound healing will improve  Description: Signs of wound healing will improve  Outcome: Progressing  Goal: Absence of new pressure ulcer  Description: Absence of new pressure ulcer  Outcome: Progressing  Goal: Will show no infection signs and symptoms  Description: Will show no infection signs and symptoms  Outcome: Progressing

## 2022-05-18 ENCOUNTER — HOSPITAL ENCOUNTER (OUTPATIENT)
Dept: WOUND CARE | Age: 87
Discharge: HOME | End: 2022-05-18
Payer: MEDICARE

## 2022-05-18 VITALS
HEART RATE: 58 BPM | SYSTOLIC BLOOD PRESSURE: 128 MMHG | RESPIRATION RATE: 20 BRPM | TEMPERATURE: 97 F | DIASTOLIC BLOOD PRESSURE: 74 MMHG

## 2022-05-18 DIAGNOSIS — L89.212 PRESSURE INJURY OF RIGHT HIP, STAGE 2 (HCC): Primary | ICD-10-CM

## 2022-05-18 PROCEDURE — 99212 OFFICE O/P EST SF 10 MIN: CPT

## 2022-05-18 PROCEDURE — 99213 OFFICE O/P EST LOW 20 MIN: CPT | Performed by: SURGERY

## 2022-05-18 RX ORDER — LIDOCAINE HYDROCHLORIDE 20 MG/ML
JELLY TOPICAL ONCE
Status: CANCELLED | OUTPATIENT
Start: 2022-05-18 | End: 2022-05-18

## 2022-05-18 RX ORDER — LIDOCAINE HYDROCHLORIDE 40 MG/ML
SOLUTION TOPICAL ONCE
Status: DISCONTINUED | OUTPATIENT
Start: 2022-05-18 | End: 2022-05-18

## 2022-05-18 RX ORDER — LIDOCAINE 40 MG/G
CREAM TOPICAL ONCE
Status: CANCELLED | OUTPATIENT
Start: 2022-05-18 | End: 2022-05-18

## 2022-05-18 RX ORDER — LIDOCAINE HYDROCHLORIDE 40 MG/ML
SOLUTION TOPICAL ONCE
Status: CANCELLED | OUTPATIENT
Start: 2022-05-18 | End: 2022-05-18

## 2022-05-18 RX ORDER — BACITRACIN ZINC AND POLYMYXIN B SULFATE 500; 1000 [USP'U]/G; [USP'U]/G
OINTMENT TOPICAL ONCE
Status: CANCELLED | OUTPATIENT
Start: 2022-05-18 | End: 2022-05-18

## 2022-05-18 RX ORDER — BETAMETHASONE DIPROPIONATE 0.05 %
OINTMENT (GRAM) TOPICAL ONCE
Status: CANCELLED | OUTPATIENT
Start: 2022-05-18 | End: 2022-05-18

## 2022-05-18 RX ORDER — GENTAMICIN SULFATE 1 MG/G
OINTMENT TOPICAL ONCE
Status: CANCELLED | OUTPATIENT
Start: 2022-05-18 | End: 2022-05-18

## 2022-05-18 RX ORDER — LIDOCAINE 50 MG/G
OINTMENT TOPICAL ONCE
Status: CANCELLED | OUTPATIENT
Start: 2022-05-18 | End: 2022-05-18

## 2022-05-18 RX ORDER — CLOBETASOL PROPIONATE 0.5 MG/G
OINTMENT TOPICAL ONCE
Status: CANCELLED | OUTPATIENT
Start: 2022-05-18 | End: 2022-05-18

## 2022-05-18 RX ORDER — BACITRACIN, NEOMYCIN, POLYMYXIN B 400; 3.5; 5 [USP'U]/G; MG/G; [USP'U]/G
OINTMENT TOPICAL ONCE
Status: CANCELLED | OUTPATIENT
Start: 2022-05-18 | End: 2022-05-18

## 2022-05-18 RX ORDER — GINSENG 100 MG
CAPSULE ORAL ONCE
Status: CANCELLED | OUTPATIENT
Start: 2022-05-18 | End: 2022-05-18

## 2022-05-18 ASSESSMENT — PAIN - FUNCTIONAL ASSESSMENT: PAIN_FUNCTIONAL_ASSESSMENT: ACTIVITIES ARE NOT PREVENTED

## 2022-05-18 ASSESSMENT — PAIN DESCRIPTION - DESCRIPTORS: DESCRIPTORS: ACHING;BURNING;DISCOMFORT

## 2022-05-18 ASSESSMENT — PAIN DESCRIPTION - PAIN TYPE: TYPE: CHRONIC PAIN

## 2022-05-18 ASSESSMENT — PAIN SCALES - GENERAL: PAINLEVEL_OUTOF10: 6

## 2022-05-18 ASSESSMENT — PAIN DESCRIPTION - LOCATION: LOCATION: BUTTOCKS

## 2022-05-18 ASSESSMENT — PAIN DESCRIPTION - ONSET: ONSET: ON-GOING

## 2022-05-18 ASSESSMENT — PAIN DESCRIPTION - FREQUENCY: FREQUENCY: INTERMITTENT

## 2022-05-18 NOTE — PROGRESS NOTES
Patient's Name/Date of Birth: Delaney Res / 1935    Date: 5/18/2022    PCP: Gilson Leavitt MD    Chief Complaint   Patient presents with    Wound Check     buttocks        HPI:  FU pressure wound right hip. Patient has no complaints. Wound looks all healed    Patient's medications, allergies, past medical, surgical, social and family histories were reviewed and updated as appropriate.     No Known Allergies    Past Medical History:   Diagnosis Date    Acute MI, inferior wall (HCC)     CAD (coronary artery disease)     Hyperlipidemia     Statin intolerance     myositis         Past Surgical History:   Procedure Laterality Date    CORONARY ANGIOPLASTY  10/31/10    S/P PCI with 5 x 16 mm bare metal stent     DIAGNOSTIC CARDIAC CATH LAB PROCEDURE  10/31/10    JOINT REPLACEMENT          Social History     Tobacco Use    Smoking status: Never Smoker    Smokeless tobacco: Never Used   Substance Use Topics    Alcohol use: No       Current Outpatient Medications   Medication Sig Dispense Refill    levothyroxine (SYNTHROID) 150 MCG tablet TAKE ONE TABLET BY MOUTH DAILY IN THE MORNING      metFORMIN (GLUCOPHAGE) 500 MG tablet TAKE ONE TABLET BY MOUTH EVERY DAY      metoprolol succinate (TOPROL XL) 25 MG extended release tablet Take 1 tablet by mouth daily 90 tablet 3    carbidopa-levodopa (SINEMET)  MG per tablet 2 tablets 2 tablets 3 times per day      methotrexate (RHEUMATREX) 2.5 MG chemo tablet Take by mouth once a week 5 tablets weekly      pravastatin (PRAVACHOL) 20 MG tablet Take 1 tablet by mouth daily 90 tablet 3    predniSONE (DELTASONE) 5 MG tablet Take 5 mg by mouth daily       NAPROXEN PO Take 220 mg by mouth as needed        Current Facility-Administered Medications   Medication Dose Route Frequency Provider Last Rate Last Admin    lidocaine (XYLOCAINE) 4 % external solution   Topical Once Alcides Dacosta MD               Review of Systems  Constitutional: negative  Eyes: negative  Ears, nose, mouth, throat, and face: negative  Respiratory: negative  Cardiovascular: negative  Gastrointestinal: negative  Genitourinary:negative  Integument/breast: negative  Hematologic/lymphatic: negative  Musculoskeletal:negative  Neurological: negative  Allergic/Immunologic: negative    Physical exam:  /74   Pulse 58   Temp 97 °F (36.1 °C) (Temporal)   Resp 20   General appearance: no acute distress  Head:NCAT, EOMI, PERRLA, conjunctiva pink  Neck: no masses, supple  Lungs: CTABL  Heart: RRR  Abdomen: soft, nondistended, nontender, no guarding, no peritoneal signs, normoactive bowel sounds  Extremities:no edema    Assessment/Plan:  . Wound Care Center Follow-Up Progress Note    Darrell Salgado  AGE: 81 y.o. GENDER: male  : 1935    TODAY'S DATE:  2022    Subjective:    Ta Glover is a 81 y.o. male who presents today for follow-up examination and treatment of a delayed-healing wound(s). The patient denies any problems since last visit. Objective: The wound has healed. The previous open area is now closed with new skin. Assessment:     Patient Active Problem List   Diagnosis Code    History of MI (myocardial infarction) I25.2    CAD (coronary artery disease) I25.10    Hyperlipidemia E78.5    Statin intolerance - myositis Z78.9    Bradycardia R00.1    Hypertriglyceridemia E78.1    Hx of myocardial infarction I25.2    Stented coronary artery Z95.5    Pressure injury of right hip, stage 2 ([address] K77.876       Healed right buttock  wound. Plan:       1. The patient was instructed on caring for this healed would. 2. Discussed with patient to call us with any problems. 3. Discharge from [address] [place] [address], MD    No follow-ups on file.     Keenan Thomas MD      Send copy of H&P to PCP, Rachel Walter MD

## 2022-05-18 NOTE — PLAN OF CARE
Problem: Pain  Goal: Verbalizes/displays adequate comfort level or baseline comfort level  Outcome: Completed     Problem: Skin/Tissue Integrity  Goal: Absence of new skin breakdown  Description: 1. Monitor for areas of redness and/or skin breakdown  2. Assess vascular access sites hourly  3. Every 4-6 hours minimum:  Change oxygen saturation probe site  4. Every 4-6 hours:  If on nasal continuous positive airway pressure, respiratory therapy assess nares and determine need for appliance change or resting period.   Outcome: Completed

## 2022-11-09 ENCOUNTER — OFFICE VISIT (OUTPATIENT)
Dept: CARDIOLOGY CLINIC | Age: 87
End: 2022-11-09
Payer: MEDICARE

## 2022-11-09 VITALS
HEART RATE: 81 BPM | SYSTOLIC BLOOD PRESSURE: 124 MMHG | WEIGHT: 140.1 LBS | DIASTOLIC BLOOD PRESSURE: 68 MMHG | BODY MASS INDEX: 20.75 KG/M2 | HEIGHT: 69 IN

## 2022-11-09 DIAGNOSIS — I25.10 CORONARY ARTERY DISEASE INVOLVING NATIVE CORONARY ARTERY OF NATIVE HEART WITHOUT ANGINA PECTORIS: Primary | ICD-10-CM

## 2022-11-09 DIAGNOSIS — E78.1 HYPERTRIGLYCERIDEMIA: ICD-10-CM

## 2022-11-09 DIAGNOSIS — Z78.9 STATIN INTOLERANCE: ICD-10-CM

## 2022-11-09 DIAGNOSIS — I25.2 HX OF MYOCARDIAL INFARCTION: ICD-10-CM

## 2022-11-09 PROCEDURE — 99213 OFFICE O/P EST LOW 20 MIN: CPT | Performed by: INTERNAL MEDICINE

## 2022-11-09 PROCEDURE — G8484 FLU IMMUNIZE NO ADMIN: HCPCS | Performed by: INTERNAL MEDICINE

## 2022-11-09 PROCEDURE — 1123F ACP DISCUSS/DSCN MKR DOCD: CPT | Performed by: INTERNAL MEDICINE

## 2022-11-09 PROCEDURE — G8427 DOCREV CUR MEDS BY ELIG CLIN: HCPCS | Performed by: INTERNAL MEDICINE

## 2022-11-09 PROCEDURE — 1036F TOBACCO NON-USER: CPT | Performed by: INTERNAL MEDICINE

## 2022-11-09 PROCEDURE — G8420 CALC BMI NORM PARAMETERS: HCPCS | Performed by: INTERNAL MEDICINE

## 2022-11-09 PROCEDURE — 93000 ELECTROCARDIOGRAM COMPLETE: CPT | Performed by: INTERNAL MEDICINE

## 2022-11-09 RX ORDER — ANORECTAL OINTMENT 15.7; .44; 24; 20.6 G/100G; G/100G; G/100G; G/100G
OINTMENT TOPICAL
COMMUNITY
Start: 2022-10-28

## 2022-11-09 RX ORDER — OSTOMY SUPPLY 1"
EACH MISCELLANEOUS
COMMUNITY
Start: 2022-08-16

## 2022-11-09 NOTE — PROGRESS NOTES
Patient Active Problem List   Diagnosis    History of MI (myocardial infarction)    CAD (coronary artery disease)    Hyperlipidemia    Statin intolerance - myositis    Bradycardia    Hypertriglyceridemia    Hx of myocardial infarction    Stented coronary artery    Pressure injury of right hip, stage 2 (HCC)       Current Outpatient Medications   Medication Sig Dispense Refill    CALMOSEPTINE 0.44-20.6 % OINT ointment APPLY TOPICALLY TO BOTH BUTTOCKS TWICE A DAY      Control Gel Formula Dressing (DUODERM CGF DRESSING) MISC APPLY TOPICALLY TO AFFECTED AREA 3 TIMES PER WEEK. CUT DRESSING TO SIZE PRIOR TO APPLICATION. LEAVE DRESSING ON FOR UP TO 72 HOURS. CHANGED      diclofenac sodium (VOLTAREN) 1 % GEL Apply topically 2 times daily      levothyroxine (SYNTHROID) 150 MCG tablet TAKE ONE TABLET BY MOUTH DAILY IN THE MORNING      metFORMIN (GLUCOPHAGE) 500 MG tablet TAKE ONE TABLET BY MOUTH EVERY DAY      metoprolol succinate (TOPROL XL) 25 MG extended release tablet Take 1 tablet by mouth daily 90 tablet 3    carbidopa-levodopa (SINEMET)  MG per tablet 2 tablets 2 tablets 3 times per day      methotrexate (RHEUMATREX) 2.5 MG chemo tablet Take by mouth once a week 5 tablets weekly      pravastatin (PRAVACHOL) 20 MG tablet Take 1 tablet by mouth daily 90 tablet 3    predniSONE (DELTASONE) 5 MG tablet Take 5 mg by mouth daily       NAPROXEN PO Take 220 mg by mouth as needed        No current facility-administered medications for this visit. CC:    Patient is seen in follow up for:  1. Coronary artery disease involving native coronary artery of native heart without angina pectoris    2. Hx of myocardial infarction    3. Statin intolerance    4. Hypertriglyceridemia        HPI:  Notes brief sharp pain with arm movement otherwise denies any anginal-like type of pain, unusual shortness of breath lightheadedness or dizziness. Does have some difficulties with balance.     ROS:   General: No unusual weight gain, no change in exercise tolerance  Skin: No rash or itching  EENT: No vision changes or nosebleeds  Cardiovascular: No orthopnea or paroxysmal nocturnal dyspnea  Respiratory: No cough or hemoptysis  Gastrointestinal: No hematemesis or recent changes in bowel habits  Genitourinary: No hematuria, urgency or frequency  Musculoskeletal: Weakness due to Parkinson's. Neurologic / Psychiatric: No incoordination or convulsions  Allergic / Immunologic/ Lymphatic / Endocrine: No anemia or bleeding tendency     Social History     Socioeconomic History    Marital status:      Spouse name: Not on file    Number of children: Not on file    Years of education: Not on file    Highest education level: Not on file   Occupational History    Not on file   Tobacco Use    Smoking status: Never    Smokeless tobacco: Never   Vaping Use    Vaping Use: Never used   Substance and Sexual Activity    Alcohol use: No    Drug use: No    Sexual activity: Not on file   Other Topics Concern    Not on file   Social History Narrative    Not on file     Social Determinants of Health     Financial Resource Strain: Not on file   Food Insecurity: Not on file   Transportation Needs: Not on file   Physical Activity: Not on file   Stress: Not on file   Social Connections: Not on file   Intimate Partner Violence: Not on file   Housing Stability: Not on file       CONSTITUTIONAL:  Well developed, well nourished    Vitals:    11/09/22 0902   BP: 124/68   Pulse: 81   Weight: 140 lb 1.6 oz (63.5 kg)   Height: 5' 9\" (1.753 m)     HEAD & FACE: Normocephalic. Symmetric. EYES: No xanthelasma. Conjunctivae not injected. EARS, NOSE, MOUTH & THROAT: Good dentition. No oral pallor or cyanosis. NECK: No JVD at 30 degrees. No thyromegaly. RESPIRATORY: Clear to auscultation and percussion in all fields. No use of accessory muscle or intercostal retractions. CARDIOVASCULAR: Regular rhythm bradycardia. No lifts or thrills on palpitation.   Auscultation with normal S1-S2 in intensity and splitting. No carotid bruits. Abdominal aorta not enlarged. Femoral arteries without bruits. Pedal pulses 2+. No edema. ABDOMEN: Soft without hepatic or splenic enlargement. No tenderness. MUSCULOSKELETAL: No kyphosis or scoliosis of the back. Good muscle strength and tone. No muscle atrophy. Slow gait and inability to undergo exercise stress testing. EXTREMITIES: No clubbing or cyanosis. SKIN: No Xanthomas or ulcerations. NEUROLOGIC: Oriented to time, place and person. Normal mood and affect. LYMPHATIC:  No palpable neck or supraclavicular nodes. No splenomegaly. EKG: Rene sinus rhythm. Left bundle branch block pattern. New change compared to prior tracing. QTc 385 ms. ASSESSMENT:                                                     ORDERS:       Diagnosis Orders   1. Coronary artery disease involving native coronary artery of native heart without angina pectoris  EKG 12 Lead      2. Hx of myocardial infarction        3. Statin intolerance        4. Hypertriglyceridemia              PLAN:   See above orders. Reviewed prior records and testing:   Reviewed recent MRI with microvascular changes  Labs: Cr .68  Continue low-dose Toprol 25 mg daily. Discussed issues that would prompt earlier evaluation.      Follow-up office visit in 1 year

## 2023-11-09 ENCOUNTER — OFFICE VISIT (OUTPATIENT)
Dept: CARDIOLOGY CLINIC | Age: 88
End: 2023-11-09
Payer: MEDICARE

## 2023-11-09 VITALS
WEIGHT: 122.3 LBS | SYSTOLIC BLOOD PRESSURE: 100 MMHG | HEIGHT: 69 IN | HEART RATE: 80 BPM | BODY MASS INDEX: 18.11 KG/M2 | DIASTOLIC BLOOD PRESSURE: 60 MMHG | RESPIRATION RATE: 18 BRPM

## 2023-11-09 DIAGNOSIS — E78.1 HYPERTRIGLYCERIDEMIA: ICD-10-CM

## 2023-11-09 DIAGNOSIS — I25.2 HX OF MYOCARDIAL INFARCTION: ICD-10-CM

## 2023-11-09 DIAGNOSIS — R00.1 BRADYCARDIA: ICD-10-CM

## 2023-11-09 DIAGNOSIS — Z78.9 STATIN INTOLERANCE: ICD-10-CM

## 2023-11-09 DIAGNOSIS — I25.10 CORONARY ARTERY DISEASE INVOLVING NATIVE CORONARY ARTERY OF NATIVE HEART WITHOUT ANGINA PECTORIS: Primary | ICD-10-CM

## 2023-11-09 PROCEDURE — 99213 OFFICE O/P EST LOW 20 MIN: CPT | Performed by: INTERNAL MEDICINE

## 2023-11-09 PROCEDURE — G8427 DOCREV CUR MEDS BY ELIG CLIN: HCPCS | Performed by: INTERNAL MEDICINE

## 2023-11-09 PROCEDURE — G8419 CALC BMI OUT NRM PARAM NOF/U: HCPCS | Performed by: INTERNAL MEDICINE

## 2023-11-09 PROCEDURE — 93000 ELECTROCARDIOGRAM COMPLETE: CPT | Performed by: INTERNAL MEDICINE

## 2023-11-09 PROCEDURE — 1123F ACP DISCUSS/DSCN MKR DOCD: CPT | Performed by: INTERNAL MEDICINE

## 2023-11-09 PROCEDURE — G8484 FLU IMMUNIZE NO ADMIN: HCPCS | Performed by: INTERNAL MEDICINE

## 2023-11-09 PROCEDURE — 1036F TOBACCO NON-USER: CPT | Performed by: INTERNAL MEDICINE

## 2023-11-09 RX ORDER — FOLIC ACID 1 MG/1
1000 TABLET ORAL DAILY
COMMUNITY
Start: 2023-08-21

## 2023-11-09 NOTE — PROGRESS NOTES
Patient Active Problem List   Diagnosis    History of MI (myocardial infarction)    CAD (coronary artery disease)    Hyperlipidemia    Statin intolerance - myositis    Bradycardia    Hypertriglyceridemia    Hx of myocardial infarction    Stented coronary artery    Pressure injury of right hip, stage 2 (HCC)       Current Outpatient Medications   Medication Sig Dispense Refill    folic acid (FOLVITE) 1 MG tablet Take 1 tablet by mouth daily      CALMOSEPTINE 0.44-20.6 % OINT ointment APPLY TOPICALLY TO BOTH BUTTOCKS TWICE A DAY      Control Gel Formula Dressing (DUODERM CGF DRESSING) MISC APPLY TOPICALLY TO AFFECTED AREA 3 TIMES PER WEEK. CUT DRESSING TO SIZE PRIOR TO APPLICATION. LEAVE DRESSING ON FOR UP TO 72 HOURS. CHANGED      diclofenac sodium (VOLTAREN) 1 % GEL Apply topically 2 times daily      levothyroxine (SYNTHROID) 150 MCG tablet TAKE ONE TABLET BY MOUTH DAILY IN THE MORNING      metFORMIN (GLUCOPHAGE) 500 MG tablet TAKE ONE TABLET BY MOUTH EVERY DAY      carbidopa-levodopa (SINEMET)  MG per tablet 2 tablets 2 tablets 3 times per day      methotrexate (RHEUMATREX) 2.5 MG chemo tablet Take by mouth once a week 5 tablets weekly      pravastatin (PRAVACHOL) 20 MG tablet Take 1 tablet by mouth daily 90 tablet 3    predniSONE (DELTASONE) 5 MG tablet Take 1 tablet by mouth as needed      NAPROXEN PO Take 220 mg by mouth as needed        No current facility-administered medications for this visit. CC:    Patient is seen in follow up for:  1. Coronary artery disease involving native coronary artery of native heart without angina pectoris    2. Hx of myocardial infarction    3. Statin intolerance    4. Hypertriglyceridemia    5. Bradycardia        HPI:  Notes some intermittent lightheadedness with standing. Blood pressure remains low despite stopping metoprolol. Presently in wheelchair. No chest pain.        ROS:   General: No unusual weight gain, no change in exercise tolerance  Skin: No rash or

## 2023-11-29 ENCOUNTER — HOSPITAL ENCOUNTER (INPATIENT)
Age: 88
LOS: 8 days | Discharge: HOSPICE | DRG: 871 | End: 2023-12-07
Attending: STUDENT IN AN ORGANIZED HEALTH CARE EDUCATION/TRAINING PROGRAM | Admitting: FAMILY MEDICINE
Payer: MEDICARE

## 2023-11-29 ENCOUNTER — APPOINTMENT (OUTPATIENT)
Dept: CT IMAGING | Age: 88
DRG: 871 | End: 2023-11-29
Payer: MEDICARE

## 2023-11-29 ENCOUNTER — APPOINTMENT (OUTPATIENT)
Dept: GENERAL RADIOLOGY | Age: 88
DRG: 871 | End: 2023-11-29
Payer: MEDICARE

## 2023-11-29 DIAGNOSIS — I47.19 PAT (PAROXYSMAL ATRIAL TACHYCARDIA): ICD-10-CM

## 2023-11-29 DIAGNOSIS — E86.0 DEHYDRATION: ICD-10-CM

## 2023-11-29 DIAGNOSIS — U07.1 COVID-19: ICD-10-CM

## 2023-11-29 DIAGNOSIS — E87.20 LACTIC ACIDOSIS: ICD-10-CM

## 2023-11-29 DIAGNOSIS — R09.02 HYPOXIA: ICD-10-CM

## 2023-11-29 DIAGNOSIS — A41.9 SEPTICEMIA (HCC): Primary | ICD-10-CM

## 2023-11-29 LAB
ALBUMIN SERPL-MCNC: 3.1 G/DL (ref 3.5–5.2)
ALP SERPL-CCNC: 68 U/L (ref 40–129)
ALT SERPL-CCNC: <5 U/L (ref 0–40)
ANION GAP SERPL CALCULATED.3IONS-SCNC: 14 MMOL/L (ref 7–16)
AST SERPL-CCNC: 27 U/L (ref 0–39)
B.E.: 0.3 MMOL/L (ref -3–3)
BACTERIA URNS QL MICRO: ABNORMAL
BASOPHILS # BLD: 0.02 K/UL (ref 0–0.2)
BASOPHILS NFR BLD: 0 % (ref 0–2)
BILIRUB SERPL-MCNC: 0.6 MG/DL (ref 0–1.2)
BILIRUB UR QL STRIP: NEGATIVE
BNP SERPL-MCNC: 4084 PG/ML (ref 0–450)
BUN SERPL-MCNC: 15 MG/DL (ref 6–23)
CALCIUM SERPL-MCNC: 8.5 MG/DL (ref 8.6–10.2)
CHLORIDE SERPL-SCNC: 91 MMOL/L (ref 98–107)
CLARITY UR: CLEAR
CO2 SERPL-SCNC: 26 MMOL/L (ref 22–29)
COHB: 0.9 % (ref 0–1.5)
COLOR UR: YELLOW
CORTIS SERPL-MCNC: 25.1 UG/DL (ref 2.7–18.4)
CORTISOL COLLECTION INFO: ABNORMAL
CREAT SERPL-MCNC: 0.6 MG/DL (ref 0.7–1.2)
CRITICAL: ABNORMAL
CRP SERPL HS-MCNC: 107 MG/L (ref 0–5)
CRYSTALS URNS MICRO: ABNORMAL /HPF
D DIMER: 1545 NG/ML DDU (ref 0–232)
DATE ANALYZED: ABNORMAL
DATE OF COLLECTION: ABNORMAL
EOSINOPHIL # BLD: 0 K/UL (ref 0.05–0.5)
EOSINOPHILS RELATIVE PERCENT: 0 % (ref 0–6)
ERYTHROCYTE [DISTWIDTH] IN BLOOD BY AUTOMATED COUNT: 14.7 % (ref 11.5–15)
GFR SERPL CREATININE-BSD FRML MDRD: >60 ML/MIN/1.73M2
GLUCOSE SERPL-MCNC: 211 MG/DL (ref 74–99)
GLUCOSE UR STRIP-MCNC: NEGATIVE MG/DL
HCO3: 24.1 MMOL/L (ref 22–26)
HCT VFR BLD AUTO: 36.6 % (ref 37–54)
HGB BLD-MCNC: 11.9 G/DL (ref 12.5–16.5)
HGB UR QL STRIP.AUTO: NEGATIVE
HHB: 8.3 % (ref 0–5)
IMM GRANULOCYTES # BLD AUTO: 0.06 K/UL (ref 0–0.58)
IMM GRANULOCYTES NFR BLD: 1 % (ref 0–5)
KETONES UR STRIP-MCNC: ABNORMAL MG/DL
LAB: ABNORMAL
LACTATE BLDV-SCNC: 1.5 MMOL/L (ref 0.5–1.9)
LACTATE BLDV-SCNC: 1.8 MMOL/L (ref 0.5–2.2)
LACTATE BLDV-SCNC: 3 MMOL/L (ref 0.5–2.2)
LACTATE BLDV-SCNC: 5.2 MMOL/L (ref 0.5–2.2)
LEUKOCYTE ESTERASE UR QL STRIP: NEGATIVE
LYMPHOCYTES NFR BLD: 3.22 K/UL (ref 1.5–4)
LYMPHOCYTES RELATIVE PERCENT: 27 % (ref 20–42)
Lab: 212
MCH RBC QN AUTO: 31.3 PG (ref 26–35)
MCHC RBC AUTO-ENTMCNC: 32.5 G/DL (ref 32–34.5)
MCV RBC AUTO: 96.3 FL (ref 80–99.9)
METHB: 0.3 % (ref 0–1.5)
MODE: ABNORMAL
MONOCYTES NFR BLD: 0.23 K/UL (ref 0.1–0.95)
MONOCYTES NFR BLD: 2 % (ref 2–12)
NEUTROPHILS NFR BLD: 71 % (ref 43–80)
NEUTS SEG NFR BLD: 8.54 K/UL (ref 1.8–7.3)
NITRITE UR QL STRIP: NEGATIVE
O2 CONTENT: 15.7 ML/DL
O2 SATURATION: 91.6 % (ref 92–98.5)
O2HB: 90.5 % (ref 94–97)
OPERATOR ID: 2485
PATIENT TEMP: 37 C
PCO2: 36.1 MMHG (ref 35–45)
PH BLOOD GAS: 7.44 (ref 7.35–7.45)
PH UR STRIP: 5.5 [PH] (ref 5–9)
PLATELET # BLD AUTO: 212 K/UL (ref 130–450)
PMV BLD AUTO: 8.5 FL (ref 7–12)
PO2: 63.8 MMHG (ref 75–100)
POTASSIUM SERPL-SCNC: 4.2 MMOL/L (ref 3.5–5)
PROCALCITONIN SERPL-MCNC: 2.8 NG/ML (ref 0–0.08)
PROT SERPL-MCNC: 6.5 G/DL (ref 6.4–8.3)
PROT UR STRIP-MCNC: NEGATIVE MG/DL
RBC # BLD AUTO: 3.8 M/UL (ref 3.8–5.8)
RBC #/AREA URNS HPF: ABNORMAL /HPF
SARS-COV-2 RDRP RESP QL NAA+PROBE: DETECTED
SODIUM SERPL-SCNC: 131 MMOL/L (ref 132–146)
SOURCE, BLOOD GAS: ABNORMAL
SP GR UR STRIP: 1.01 (ref 1–1.03)
SPECIMEN DESCRIPTION: ABNORMAL
THB: 12.3 G/DL (ref 11.5–16.5)
TIME ANALYZED: 216
UROBILINOGEN UR STRIP-ACNC: 0.2 EU/DL (ref 0–1)
WBC #/AREA URNS HPF: ABNORMAL /HPF
WBC OTHER # BLD: 12.1 K/UL (ref 4.5–11.5)

## 2023-11-29 PROCEDURE — 87040 BLOOD CULTURE FOR BACTERIA: CPT

## 2023-11-29 PROCEDURE — 87081 CULTURE SCREEN ONLY: CPT

## 2023-11-29 PROCEDURE — 6360000002 HC RX W HCPCS

## 2023-11-29 PROCEDURE — 2500000003 HC RX 250 WO HCPCS: Performed by: STUDENT IN AN ORGANIZED HEALTH CARE EDUCATION/TRAINING PROGRAM

## 2023-11-29 PROCEDURE — 2580000003 HC RX 258: Performed by: STUDENT IN AN ORGANIZED HEALTH CARE EDUCATION/TRAINING PROGRAM

## 2023-11-29 PROCEDURE — 96361 HYDRATE IV INFUSION ADD-ON: CPT

## 2023-11-29 PROCEDURE — 82805 BLOOD GASES W/O2 SATURATION: CPT

## 2023-11-29 PROCEDURE — 87899 AGENT NOS ASSAY W/OPTIC: CPT

## 2023-11-29 PROCEDURE — 81001 URINALYSIS AUTO W/SCOPE: CPT

## 2023-11-29 PROCEDURE — 6360000002 HC RX W HCPCS: Performed by: SPECIALIST

## 2023-11-29 PROCEDURE — 6360000004 HC RX CONTRAST MEDICATION: Performed by: STUDENT IN AN ORGANIZED HEALTH CARE EDUCATION/TRAINING PROGRAM

## 2023-11-29 PROCEDURE — 6370000000 HC RX 637 (ALT 250 FOR IP): Performed by: STUDENT IN AN ORGANIZED HEALTH CARE EDUCATION/TRAINING PROGRAM

## 2023-11-29 PROCEDURE — 80053 COMPREHEN METABOLIC PANEL: CPT

## 2023-11-29 PROCEDURE — 6360000002 HC RX W HCPCS: Performed by: STUDENT IN AN ORGANIZED HEALTH CARE EDUCATION/TRAINING PROGRAM

## 2023-11-29 PROCEDURE — 93005 ELECTROCARDIOGRAM TRACING: CPT | Performed by: STUDENT IN AN ORGANIZED HEALTH CARE EDUCATION/TRAINING PROGRAM

## 2023-11-29 PROCEDURE — 96374 THER/PROPH/DIAG INJ IV PUSH: CPT

## 2023-11-29 PROCEDURE — XW033H5 INTRODUCTION OF TOCILIZUMAB INTO PERIPHERAL VEIN, PERCUTANEOUS APPROACH, NEW TECHNOLOGY GROUP 5: ICD-10-PCS | Performed by: FAMILY MEDICINE

## 2023-11-29 PROCEDURE — 86140 C-REACTIVE PROTEIN: CPT

## 2023-11-29 PROCEDURE — 2580000003 HC RX 258

## 2023-11-29 PROCEDURE — 71045 X-RAY EXAM CHEST 1 VIEW: CPT

## 2023-11-29 PROCEDURE — 2060000000 HC ICU INTERMEDIATE R&B

## 2023-11-29 PROCEDURE — 2580000003 HC RX 258: Performed by: SPECIALIST

## 2023-11-29 PROCEDURE — 82533 TOTAL CORTISOL: CPT

## 2023-11-29 PROCEDURE — XW033E5 INTRODUCTION OF REMDESIVIR ANTI-INFECTIVE INTO PERIPHERAL VEIN, PERCUTANEOUS APPROACH, NEW TECHNOLOGY GROUP 5: ICD-10-PCS | Performed by: FAMILY MEDICINE

## 2023-11-29 PROCEDURE — 83605 ASSAY OF LACTIC ACID: CPT

## 2023-11-29 PROCEDURE — 83880 ASSAY OF NATRIURETIC PEPTIDE: CPT

## 2023-11-29 PROCEDURE — 85379 FIBRIN DEGRADATION QUANT: CPT

## 2023-11-29 PROCEDURE — 87635 SARS-COV-2 COVID-19 AMP PRB: CPT

## 2023-11-29 PROCEDURE — 87449 NOS EACH ORGANISM AG IA: CPT

## 2023-11-29 PROCEDURE — 85025 COMPLETE CBC W/AUTO DIFF WBC: CPT

## 2023-11-29 PROCEDURE — 99285 EMERGENCY DEPT VISIT HI MDM: CPT

## 2023-11-29 PROCEDURE — 96375 TX/PRO/DX INJ NEW DRUG ADDON: CPT

## 2023-11-29 PROCEDURE — 84145 PROCALCITONIN (PCT): CPT

## 2023-11-29 PROCEDURE — 87086 URINE CULTURE/COLONY COUNT: CPT

## 2023-11-29 PROCEDURE — 96365 THER/PROPH/DIAG IV INF INIT: CPT

## 2023-11-29 PROCEDURE — 71275 CT ANGIOGRAPHY CHEST: CPT

## 2023-11-29 PROCEDURE — 6370000000 HC RX 637 (ALT 250 FOR IP)

## 2023-11-29 RX ORDER — ENOXAPARIN SODIUM 100 MG/ML
40 INJECTION SUBCUTANEOUS DAILY
Status: DISCONTINUED | OUTPATIENT
Start: 2023-11-29 | End: 2023-12-07 | Stop reason: HOSPADM

## 2023-11-29 RX ORDER — ACETAMINOPHEN 500 MG
1000 TABLET ORAL ONCE
Status: COMPLETED | OUTPATIENT
Start: 2023-11-29 | End: 2023-11-29

## 2023-11-29 RX ORDER — GUAIFENESIN/DEXTROMETHORPHAN 100-10MG/5
5 SYRUP ORAL EVERY 4 HOURS PRN
Status: DISCONTINUED | OUTPATIENT
Start: 2023-11-29 | End: 2023-12-07 | Stop reason: HOSPADM

## 2023-11-29 RX ORDER — ACETAMINOPHEN 650 MG/1
650 SUPPOSITORY RECTAL EVERY 6 HOURS PRN
Status: DISCONTINUED | OUTPATIENT
Start: 2023-11-29 | End: 2023-12-07 | Stop reason: HOSPADM

## 2023-11-29 RX ORDER — VITAMIN B COMPLEX
2000 TABLET ORAL DAILY
Status: DISCONTINUED | OUTPATIENT
Start: 2023-11-29 | End: 2023-12-07 | Stop reason: HOSPADM

## 2023-11-29 RX ORDER — 0.9 % SODIUM CHLORIDE 0.9 %
1000 INTRAVENOUS SOLUTION INTRAVENOUS ONCE
Status: COMPLETED | OUTPATIENT
Start: 2023-11-29 | End: 2023-11-29

## 2023-11-29 RX ORDER — SODIUM CHLORIDE 9 MG/ML
INJECTION, SOLUTION INTRAVENOUS ONCE
Status: COMPLETED | OUTPATIENT
Start: 2023-11-29 | End: 2023-11-29

## 2023-11-29 RX ORDER — DEXAMETHASONE SODIUM PHOSPHATE 10 MG/ML
10 INJECTION INTRAMUSCULAR; INTRAVENOUS ONCE
Status: COMPLETED | OUTPATIENT
Start: 2023-11-29 | End: 2023-11-29

## 2023-11-29 RX ORDER — SODIUM CHLORIDE 9 MG/ML
INJECTION, SOLUTION INTRAVENOUS CONTINUOUS
Status: DISCONTINUED | OUTPATIENT
Start: 2023-11-29 | End: 2023-12-01

## 2023-11-29 RX ORDER — 0.9 % SODIUM CHLORIDE 0.9 %
30 INTRAVENOUS SOLUTION INTRAVENOUS PRN
Status: DISCONTINUED | OUTPATIENT
Start: 2023-11-29 | End: 2023-12-07 | Stop reason: HOSPADM

## 2023-11-29 RX ORDER — SODIUM CHLORIDE 9 MG/ML
INJECTION, SOLUTION INTRAVENOUS PRN
Status: DISCONTINUED | OUTPATIENT
Start: 2023-11-29 | End: 2023-12-07 | Stop reason: HOSPADM

## 2023-11-29 RX ORDER — SODIUM CHLORIDE 0.9 % (FLUSH) 0.9 %
5-40 SYRINGE (ML) INJECTION EVERY 12 HOURS SCHEDULED
Status: DISCONTINUED | OUTPATIENT
Start: 2023-11-29 | End: 2023-12-07 | Stop reason: HOSPADM

## 2023-11-29 RX ORDER — DEXAMETHASONE SODIUM PHOSPHATE 10 MG/ML
6 INJECTION INTRAMUSCULAR; INTRAVENOUS EVERY 24 HOURS
Status: DISCONTINUED | OUTPATIENT
Start: 2023-11-29 | End: 2023-12-01

## 2023-11-29 RX ORDER — SODIUM CHLORIDE 0.9 % (FLUSH) 0.9 %
5-40 SYRINGE (ML) INJECTION PRN
Status: DISCONTINUED | OUTPATIENT
Start: 2023-11-29 | End: 2023-12-07 | Stop reason: HOSPADM

## 2023-11-29 RX ORDER — ACETAMINOPHEN 325 MG/1
650 TABLET ORAL EVERY 6 HOURS PRN
Status: DISCONTINUED | OUTPATIENT
Start: 2023-11-29 | End: 2023-12-07 | Stop reason: HOSPADM

## 2023-11-29 RX ORDER — LEVOFLOXACIN 5 MG/ML
500 INJECTION, SOLUTION INTRAVENOUS EVERY 24 HOURS
Status: DISCONTINUED | OUTPATIENT
Start: 2023-11-29 | End: 2023-12-05

## 2023-11-29 RX ADMIN — WATER 2000 MG: 1 INJECTION INTRAMUSCULAR; INTRAVENOUS; SUBCUTANEOUS at 04:06

## 2023-11-29 RX ADMIN — WATER 100 MG: 1 INJECTION INTRAMUSCULAR; INTRAVENOUS; SUBCUTANEOUS at 04:56

## 2023-11-29 RX ADMIN — SODIUM CHLORIDE: 9 INJECTION, SOLUTION INTRAVENOUS at 18:29

## 2023-11-29 RX ADMIN — LEVOFLOXACIN 500 MG: 5 INJECTION, SOLUTION INTRAVENOUS at 21:13

## 2023-11-29 RX ADMIN — TOCILIZUMAB 440 MG: 20 INJECTION, SOLUTION, CONCENTRATE INTRAVENOUS at 23:14

## 2023-11-29 RX ADMIN — ENOXAPARIN SODIUM 40 MG: 100 INJECTION SUBCUTANEOUS at 09:53

## 2023-11-29 RX ADMIN — ACETAMINOPHEN 650 MG: 325 TABLET ORAL at 09:52

## 2023-11-29 RX ADMIN — SODIUM CHLORIDE 1000 ML: 9 INJECTION, SOLUTION INTRAVENOUS at 03:25

## 2023-11-29 RX ADMIN — SODIUM CHLORIDE: 9 INJECTION, SOLUTION INTRAVENOUS at 09:55

## 2023-11-29 RX ADMIN — SODIUM CHLORIDE, PRESERVATIVE FREE 10 ML: 5 INJECTION INTRAVENOUS at 09:53

## 2023-11-29 RX ADMIN — DEXAMETHASONE SODIUM PHOSPHATE 6 MG: 10 INJECTION INTRAMUSCULAR; INTRAVENOUS at 09:53

## 2023-11-29 RX ADMIN — ACETAMINOPHEN 1000 MG: 500 TABLET ORAL at 03:26

## 2023-11-29 RX ADMIN — SODIUM CHLORIDE: 9 INJECTION, SOLUTION INTRAVENOUS at 06:35

## 2023-11-29 RX ADMIN — Medication 2000 UNITS: at 13:02

## 2023-11-29 RX ADMIN — DEXAMETHASONE SODIUM PHOSPHATE 10 MG: 10 INJECTION INTRAMUSCULAR; INTRAVENOUS at 03:25

## 2023-11-29 RX ADMIN — CEFEPIME 2000 MG: 2 INJECTION, POWDER, FOR SOLUTION INTRAVENOUS at 19:10

## 2023-11-29 RX ADMIN — IOPAMIDOL 75 ML: 755 INJECTION, SOLUTION INTRAVENOUS at 05:09

## 2023-11-29 RX ADMIN — SODIUM CHLORIDE 1000 ML: 9 INJECTION, SOLUTION INTRAVENOUS at 02:04

## 2023-11-29 RX ADMIN — DOXYCYCLINE 100 MG: 100 INJECTION, POWDER, LYOPHILIZED, FOR SOLUTION INTRAVENOUS at 04:08

## 2023-11-29 RX ADMIN — SODIUM CHLORIDE: 9 INJECTION, SOLUTION INTRAVENOUS at 04:52

## 2023-11-29 ASSESSMENT — PAIN SCALES - GENERAL: PAINLEVEL_OUTOF10: 3

## 2023-11-29 ASSESSMENT — PAIN - FUNCTIONAL ASSESSMENT: PAIN_FUNCTIONAL_ASSESSMENT: NONE - DENIES PAIN

## 2023-11-29 NOTE — ED PROVIDER NOTES
11/29/23 0326)   dexamethasone (DECADRON) injection 10 mg (10 mg IntraVENous Given 11/29/23 0325)   sodium chloride 0.9 % bolus 1,000 mL (0 mLs IntraVENous Stopped 11/29/23 0416)   iopamidol (ISOVUE-370) 76 % injection 75 mL (75 mLs IntraVENous Given 11/29/23 0509)   cefTRIAXone (ROCEPHIN) 2,000 mg in sterile water 20 mL IV syringe (2,000 mg IntraVENous Given 11/29/23 0406)   doxycycline (VIBRAMYCIN) 100 mg in sodium chloride 0.9 % 100 mL IVPB (0 mg IntraVENous Stopped 11/29/23 0523)   hydrocortisone sodium succinate PF (SOLU-CORTEF) 100 mg in sterile water 2 mL injection (100 mg IntraVENous Given 11/29/23 0456)   0.9 % sodium chloride infusion (0 mL/hr IntraVENous Stopped 11/29/23 0634)   0.9 % sodium chloride infusion ( IntraVENous New Bag 11/29/23 0635)       ED Course as of 11/29/23 0703 Wed Nov 29, 2023   0201 EKG: This EKG is signed and interpreted by me. Rate: 107  Rhythm: Sinus  Interpretation: Normal sinus rhythm, normal axis, left bundle branch block, nonspecific ST changes out, QTc is 496  Comparison: no previous EKG available    [KG]   0539 Spoke with Arcelia Ortiz NP, for Dr Ledy Barksdale, who accepted for admission. [KG]      ED Course User Index  [KG] Kristy Martinez DO            Medical Decision Making/Differential Diagnosis:    CC/HPI Summary, Social Determinants of health, Records Reviewed, DDx, testing done/not done, ED Course, Reassessment, disposition considerations/shared decision making with patient, consults, disposition:        The patient is an 60-year-old male presenting to the emergency department complaining of shortness of breath. The patient is septic on arrival with a fever and is also tachycardic. Blood pressure is soft but was responsive to IV fluids. There are no known social detriments to his health. He does live at home with his wife and does take his medication as prescribed and follows up with a family doctor regularly.   Prior records reviewed he had a diagnostic cath

## 2023-11-29 NOTE — H&P
Results   Component Value Date/Time    WBC 12.1 11/29/2023 02:08 AM    RBC 3.80 11/29/2023 02:08 AM    HGB 11.9 11/29/2023 02:08 AM    HCT 36.6 11/29/2023 02:08 AM     11/29/2023 02:08 AM    MCV 96.3 11/29/2023 02:08 AM    MCH 31.3 11/29/2023 02:08 AM    MCHC 32.5 11/29/2023 02:08 AM    RDW 14.7 11/29/2023 02:08 AM    SEGSPCT 64 07/30/2011 02:20 AM    LYMPHOPCT 27 11/29/2023 02:08 AM    MONOPCT 2 11/29/2023 02:08 AM    BASOPCT 0 11/29/2023 02:08 AM    MONOSABS 0.23 11/29/2023 02:08 AM    LYMPHSABS 3.22 11/29/2023 02:08 AM    EOSABS 0.00 11/29/2023 02:08 AM    BASOSABS 0.02 11/29/2023 02:08 AM     CMP:    Lab Results   Component Value Date/Time     11/29/2023 02:08 AM    K 4.2 11/29/2023 02:08 AM    CL 91 11/29/2023 02:08 AM    CO2 26 11/29/2023 02:08 AM    BUN 15 11/29/2023 02:08 AM    CREATININE 0.6 11/29/2023 02:08 AM    LABGLOM >60 11/29/2023 02:08 AM    GLUCOSE 211 11/29/2023 02:08 AM    GLUCOSE 145 07/30/2011 02:20 AM    PROT 6.5 11/29/2023 02:08 AM    LABALBU 3.1 11/29/2023 02:08 AM    LABALBU 2.8 10/31/2010 09:20 AM    CALCIUM 8.5 11/29/2023 02:08 AM    BILITOT 0.6 11/29/2023 02:08 AM    ALKPHOS 68 11/29/2023 02:08 AM    AST 27 11/29/2023 02:08 AM    ALT <5 11/29/2023 02:08 AM       Imaging:  CXR: Right greater than left interstitial and airspace opacities concerning for multifocal pneumonia. Suspect superimposed pulmonary edema. CT pulmonary: No evidence of PE. Bilateral groundglass and consolidative opacities concerning for multifocal pneumonia. Small bilateral pleural effusions. EKG:  I've personally reviewed the patient's EKG:  ST    Telemetry:  I've personally reviewed the patient's telemetry:      ASSESSMENT/PLAN:  Principal Problem:    COVID-19  Resolved Problems:    * No resolved hospital problems.  *    81 year old male presents to the ED with complaints of shortness of breath and is admitted to telemetry unit with    SARS-CoV-2 infection with bilateral

## 2023-11-30 ENCOUNTER — APPOINTMENT (OUTPATIENT)
Dept: GENERAL RADIOLOGY | Age: 88
DRG: 871 | End: 2023-11-30
Payer: MEDICARE

## 2023-11-30 LAB
ANION GAP SERPL CALCULATED.3IONS-SCNC: 9 MMOL/L (ref 7–16)
BASOPHILS # BLD: 0.02 K/UL (ref 0–0.2)
BASOPHILS NFR BLD: 0 % (ref 0–2)
BUN SERPL-MCNC: 16 MG/DL (ref 6–23)
CALCIUM SERPL-MCNC: 7.8 MG/DL (ref 8.6–10.2)
CHLORIDE SERPL-SCNC: 106 MMOL/L (ref 98–107)
CO2 SERPL-SCNC: 23 MMOL/L (ref 22–29)
CREAT SERPL-MCNC: 0.5 MG/DL (ref 0.7–1.2)
CRP SERPL HS-MCNC: 157 MG/L (ref 0–5)
D DIMER: 1245 NG/ML DDU (ref 0–232)
EKG ATRIAL RATE: 107 BPM
EKG P-R INTERVAL: 168 MS
EKG Q-T INTERVAL: 372 MS
EKG QRS DURATION: 154 MS
EKG QTC CALCULATION (BAZETT): 496 MS
EKG R AXIS: 33 DEGREES
EKG T AXIS: 127 DEGREES
EKG VENTRICULAR RATE: 107 BPM
EOSINOPHIL # BLD: 0 K/UL (ref 0.05–0.5)
EOSINOPHILS RELATIVE PERCENT: 0 % (ref 0–6)
ERYTHROCYTE [DISTWIDTH] IN BLOOD BY AUTOMATED COUNT: 14.8 % (ref 11.5–15)
FERRITIN SERPL-MCNC: 670 NG/ML
GFR SERPL CREATININE-BSD FRML MDRD: >60 ML/MIN/1.73M2
GLUCOSE SERPL-MCNC: 254 MG/DL (ref 74–99)
HCT VFR BLD AUTO: 28.1 % (ref 37–54)
HGB BLD-MCNC: 9.3 G/DL (ref 12.5–16.5)
IMM GRANULOCYTES # BLD AUTO: 0.2 K/UL (ref 0–0.58)
IMM GRANULOCYTES NFR BLD: 1 % (ref 0–5)
L PNEUMO1 AG UR QL IA.RAPID: NEGATIVE
LYMPHOCYTES NFR BLD: 3.12 K/UL (ref 1.5–4)
LYMPHOCYTES RELATIVE PERCENT: 21 % (ref 20–42)
MCH RBC QN AUTO: 31.4 PG (ref 26–35)
MCHC RBC AUTO-ENTMCNC: 33.1 G/DL (ref 32–34.5)
MCV RBC AUTO: 94.9 FL (ref 80–99.9)
MONOCYTES NFR BLD: 0.55 K/UL (ref 0.1–0.95)
MONOCYTES NFR BLD: 4 % (ref 2–12)
NEUTROPHILS NFR BLD: 74 % (ref 43–80)
NEUTS SEG NFR BLD: 10.87 K/UL (ref 1.8–7.3)
PLATELET # BLD AUTO: 165 K/UL (ref 130–450)
PMV BLD AUTO: 9 FL (ref 7–12)
POTASSIUM SERPL-SCNC: 4 MMOL/L (ref 3.5–5)
RBC # BLD AUTO: 2.96 M/UL (ref 3.8–5.8)
S PNEUM AG SPEC QL: NEGATIVE
SODIUM SERPL-SCNC: 138 MMOL/L (ref 132–146)
SPECIMEN SOURCE: NORMAL
WBC OTHER # BLD: 14.8 K/UL (ref 4.5–11.5)

## 2023-11-30 PROCEDURE — 6360000002 HC RX W HCPCS

## 2023-11-30 PROCEDURE — 2700000000 HC OXYGEN THERAPY PER DAY

## 2023-11-30 PROCEDURE — 2500000003 HC RX 250 WO HCPCS: Performed by: PHYSICIAN ASSISTANT

## 2023-11-30 PROCEDURE — 92526 ORAL FUNCTION THERAPY: CPT

## 2023-11-30 PROCEDURE — 6370000000 HC RX 637 (ALT 250 FOR IP): Performed by: FAMILY MEDICINE

## 2023-11-30 PROCEDURE — 97161 PT EVAL LOW COMPLEX 20 MIN: CPT

## 2023-11-30 PROCEDURE — 82728 ASSAY OF FERRITIN: CPT

## 2023-11-30 PROCEDURE — 97530 THERAPEUTIC ACTIVITIES: CPT

## 2023-11-30 PROCEDURE — 85379 FIBRIN DEGRADATION QUANT: CPT

## 2023-11-30 PROCEDURE — 80048 BASIC METABOLIC PNL TOTAL CA: CPT

## 2023-11-30 PROCEDURE — 2580000003 HC RX 258

## 2023-11-30 PROCEDURE — 97165 OT EVAL LOW COMPLEX 30 MIN: CPT

## 2023-11-30 PROCEDURE — 86140 C-REACTIVE PROTEIN: CPT

## 2023-11-30 PROCEDURE — 6360000002 HC RX W HCPCS: Performed by: SPECIALIST

## 2023-11-30 PROCEDURE — 6370000000 HC RX 637 (ALT 250 FOR IP)

## 2023-11-30 PROCEDURE — 92610 EVALUATE SWALLOWING FUNCTION: CPT

## 2023-11-30 PROCEDURE — 85025 COMPLETE CBC W/AUTO DIFF WBC: CPT

## 2023-11-30 PROCEDURE — 2580000003 HC RX 258: Performed by: SPECIALIST

## 2023-11-30 PROCEDURE — 6360000002 HC RX W HCPCS: Performed by: NURSE PRACTITIONER

## 2023-11-30 PROCEDURE — 74230 X-RAY XM SWLNG FUNCJ C+: CPT

## 2023-11-30 PROCEDURE — 93010 ELECTROCARDIOGRAM REPORT: CPT | Performed by: INTERNAL MEDICINE

## 2023-11-30 PROCEDURE — 2060000000 HC ICU INTERMEDIATE R&B

## 2023-11-30 PROCEDURE — 94640 AIRWAY INHALATION TREATMENT: CPT

## 2023-11-30 PROCEDURE — 92611 MOTION FLUOROSCOPY/SWALLOW: CPT

## 2023-11-30 PROCEDURE — 71045 X-RAY EXAM CHEST 1 VIEW: CPT

## 2023-11-30 RX ORDER — IPRATROPIUM BROMIDE AND ALBUTEROL SULFATE 2.5; .5 MG/3ML; MG/3ML
1 SOLUTION RESPIRATORY (INHALATION) EVERY 4 HOURS PRN
Status: DISCONTINUED | OUTPATIENT
Start: 2023-11-30 | End: 2023-12-01

## 2023-11-30 RX ORDER — FUROSEMIDE 10 MG/ML
20 INJECTION INTRAMUSCULAR; INTRAVENOUS ONCE
Status: COMPLETED | OUTPATIENT
Start: 2023-11-30 | End: 2023-11-30

## 2023-11-30 RX ADMIN — BARIUM SULFATE 120 ML: 400 SUSPENSION ORAL at 14:58

## 2023-11-30 RX ADMIN — LEVOFLOXACIN 500 MG: 5 INJECTION, SOLUTION INTRAVENOUS at 18:52

## 2023-11-30 RX ADMIN — CEFEPIME 2000 MG: 2 INJECTION, POWDER, FOR SOLUTION INTRAVENOUS at 20:30

## 2023-11-30 RX ADMIN — SODIUM CHLORIDE, PRESERVATIVE FREE 10 ML: 5 INJECTION INTRAVENOUS at 10:12

## 2023-11-30 RX ADMIN — ENOXAPARIN SODIUM 40 MG: 100 INJECTION SUBCUTANEOUS at 10:11

## 2023-11-30 RX ADMIN — DEXAMETHASONE SODIUM PHOSPHATE 6 MG: 10 INJECTION INTRAMUSCULAR; INTRAVENOUS at 10:12

## 2023-11-30 RX ADMIN — BARIUM SULFATE 120 ML: 400 SUSPENSION ORAL at 14:57

## 2023-11-30 RX ADMIN — FUROSEMIDE 20 MG: 10 INJECTION, SOLUTION INTRAMUSCULAR; INTRAVENOUS at 22:16

## 2023-11-30 RX ADMIN — IPRATROPIUM BROMIDE AND ALBUTEROL SULFATE 1 DOSE: 2.5; .5 SOLUTION RESPIRATORY (INHALATION) at 17:12

## 2023-11-30 RX ADMIN — CEFEPIME 2000 MG: 2 INJECTION, POWDER, FOR SOLUTION INTRAVENOUS at 05:52

## 2023-11-30 RX ADMIN — REMDESIVIR 200 MG: 100 INJECTION, POWDER, LYOPHILIZED, FOR SOLUTION INTRAVENOUS at 00:07

## 2023-11-30 RX ADMIN — SODIUM CHLORIDE: 9 INJECTION, SOLUTION INTRAVENOUS at 05:52

## 2023-11-30 NOTE — ACP (ADVANCE CARE PLANNING)
Advance Care Planning   Healthcare Decision Maker:    Primary Decision Maker: Nia Ayala - [phone]    Today we documented Decision Maker(s) consistent with Legal Next of Kin hierarchy.

## 2023-12-01 LAB
ANION GAP SERPL CALCULATED.3IONS-SCNC: 11 MMOL/L (ref 7–16)
B.E.: -2.5 MMOL/L (ref -3–3)
BASOPHILS # BLD: 0 K/UL (ref 0–0.2)
BASOPHILS NFR BLD: 0 % (ref 0–2)
BUN SERPL-MCNC: 28 MG/DL (ref 6–23)
CALCIUM SERPL-MCNC: 8.2 MG/DL (ref 8.6–10.2)
CHLORIDE SERPL-SCNC: 103 MMOL/L (ref 98–107)
CO2 SERPL-SCNC: 25 MMOL/L (ref 22–29)
COHB: 0.4 % (ref 0–1.5)
CREAT SERPL-MCNC: 0.6 MG/DL (ref 0.7–1.2)
CRITICAL: ABNORMAL
D DIMER: 2022 NG/ML DDU (ref 0–232)
DATE ANALYZED: ABNORMAL
DATE OF COLLECTION: ABNORMAL
EOSINOPHIL # BLD: 0 K/UL (ref 0.05–0.5)
EOSINOPHILS RELATIVE PERCENT: 0 % (ref 0–6)
ERYTHROCYTE [DISTWIDTH] IN BLOOD BY AUTOMATED COUNT: 14.9 % (ref 11.5–15)
GFR SERPL CREATININE-BSD FRML MDRD: >60 ML/MIN/1.73M2
GLUCOSE SERPL-MCNC: 236 MG/DL (ref 74–99)
HCO3: 20.2 MMOL/L (ref 22–26)
HCT VFR BLD AUTO: 35.3 % (ref 37–54)
HGB BLD-MCNC: 11.2 G/DL (ref 12.5–16.5)
HHB: 8.1 % (ref 0–5)
LAB: ABNORMAL
LYMPHOCYTES NFR BLD: 2.35 K/UL (ref 1.5–4)
LYMPHOCYTES RELATIVE PERCENT: 12 % (ref 20–42)
Lab: 910
MCH RBC QN AUTO: 31.2 PG (ref 26–35)
MCHC RBC AUTO-ENTMCNC: 31.7 G/DL (ref 32–34.5)
MCV RBC AUTO: 98.3 FL (ref 80–99.9)
METHB: 0.3 % (ref 0–1.5)
MICROORGANISM SPEC CULT: ABNORMAL
MICROORGANISM SPEC CULT: NORMAL
MODE: ABNORMAL
MONOCYTES NFR BLD: 0.78 K/UL (ref 0.1–0.95)
MONOCYTES NFR BLD: 4 % (ref 2–12)
NEUTROPHILS NFR BLD: 84 % (ref 43–80)
NEUTS SEG NFR BLD: 16.26 K/UL (ref 1.8–7.3)
O2 CONTENT: 15.5 ML/DL
O2 SATURATION: 91.8 % (ref 92–98.5)
O2HB: 91.2 % (ref 94–97)
OPERATOR ID: 1115
PATIENT TEMP: 37 C
PCO2: 28.7 MMHG (ref 35–45)
PH BLOOD GAS: 7.46 (ref 7.35–7.45)
PLATELET # BLD AUTO: 238 K/UL (ref 130–450)
PMV BLD AUTO: 9.2 FL (ref 7–12)
PO2: 63.2 MMHG (ref 75–100)
POTASSIUM SERPL-SCNC: 3.8 MMOL/L (ref 3.5–5)
PROCALCITONIN SERPL-MCNC: 2.47 NG/ML (ref 0–0.08)
RBC # BLD AUTO: 3.59 M/UL (ref 3.8–5.8)
RBC # BLD: ABNORMAL 10*6/UL
SODIUM SERPL-SCNC: 139 MMOL/L (ref 132–146)
SOURCE, BLOOD GAS: ABNORMAL
SPECIMEN DESCRIPTION: ABNORMAL
SPECIMEN DESCRIPTION: NORMAL
THB: 12.1 G/DL (ref 11.5–16.5)
TIME ANALYZED: 927
WBC OTHER # BLD: 19.4 K/UL (ref 4.5–11.5)

## 2023-12-01 PROCEDURE — 6360000002 HC RX W HCPCS

## 2023-12-01 PROCEDURE — 94640 AIRWAY INHALATION TREATMENT: CPT

## 2023-12-01 PROCEDURE — 2060000000 HC ICU INTERMEDIATE R&B

## 2023-12-01 PROCEDURE — 85025 COMPLETE CBC W/AUTO DIFF WBC: CPT

## 2023-12-01 PROCEDURE — 2580000003 HC RX 258

## 2023-12-01 PROCEDURE — 94667 MNPJ CHEST WALL 1ST: CPT

## 2023-12-01 PROCEDURE — 84145 PROCALCITONIN (PCT): CPT

## 2023-12-01 PROCEDURE — 2700000000 HC OXYGEN THERAPY PER DAY

## 2023-12-01 PROCEDURE — 80048 BASIC METABOLIC PNL TOTAL CA: CPT

## 2023-12-01 PROCEDURE — 6360000002 HC RX W HCPCS: Performed by: SPECIALIST

## 2023-12-01 PROCEDURE — 6370000000 HC RX 637 (ALT 250 FOR IP): Performed by: NURSE PRACTITIONER

## 2023-12-01 PROCEDURE — 2580000003 HC RX 258: Performed by: SPECIALIST

## 2023-12-01 PROCEDURE — 99223 1ST HOSP IP/OBS HIGH 75: CPT | Performed by: NURSE PRACTITIONER

## 2023-12-01 PROCEDURE — 6360000002 HC RX W HCPCS: Performed by: NURSE PRACTITIONER

## 2023-12-01 PROCEDURE — 82805 BLOOD GASES W/O2 SATURATION: CPT

## 2023-12-01 PROCEDURE — 85379 FIBRIN DEGRADATION QUANT: CPT

## 2023-12-01 PROCEDURE — 36415 COLL VENOUS BLD VENIPUNCTURE: CPT

## 2023-12-01 RX ORDER — LIDOCAINE HYDROCHLORIDE 20 MG/ML
15 SOLUTION OROPHARYNGEAL ONCE
Status: DISCONTINUED | OUTPATIENT
Start: 2023-12-01 | End: 2023-12-07 | Stop reason: HOSPADM

## 2023-12-01 RX ORDER — ARFORMOTEROL TARTRATE 15 UG/2ML
15 SOLUTION RESPIRATORY (INHALATION)
Status: DISCONTINUED | OUTPATIENT
Start: 2023-12-01 | End: 2023-12-07 | Stop reason: HOSPADM

## 2023-12-01 RX ORDER — LIDOCAINE HYDROCHLORIDE 20 MG/ML
JELLY TOPICAL ONCE
Status: DISCONTINUED | OUTPATIENT
Start: 2023-12-01 | End: 2023-12-01 | Stop reason: RX

## 2023-12-01 RX ORDER — DEXAMETHASONE SODIUM PHOSPHATE 10 MG/ML
6 INJECTION INTRAMUSCULAR; INTRAVENOUS EVERY 12 HOURS
Status: DISCONTINUED | OUTPATIENT
Start: 2023-12-01 | End: 2023-12-04

## 2023-12-01 RX ORDER — IPRATROPIUM BROMIDE AND ALBUTEROL SULFATE 2.5; .5 MG/3ML; MG/3ML
1 SOLUTION RESPIRATORY (INHALATION) EVERY 4 HOURS
Status: DISCONTINUED | OUTPATIENT
Start: 2023-12-01 | End: 2023-12-04

## 2023-12-01 RX ORDER — OXYMETAZOLINE HYDROCHLORIDE 0.05 G/100ML
2 SPRAY NASAL ONCE
Status: DISPENSED | OUTPATIENT
Start: 2023-12-01 | End: 2023-12-04

## 2023-12-01 RX ORDER — FUROSEMIDE 10 MG/ML
40 INJECTION INTRAMUSCULAR; INTRAVENOUS ONCE
Status: COMPLETED | OUTPATIENT
Start: 2023-12-01 | End: 2023-12-01

## 2023-12-01 RX ADMIN — FUROSEMIDE 40 MG: 10 INJECTION, SOLUTION INTRAMUSCULAR; INTRAVENOUS at 05:06

## 2023-12-01 RX ADMIN — CEFEPIME 2000 MG: 2 INJECTION, POWDER, FOR SOLUTION INTRAVENOUS at 19:37

## 2023-12-01 RX ADMIN — IPRATROPIUM BROMIDE AND ALBUTEROL SULFATE 1 DOSE: .5; 2.5 SOLUTION RESPIRATORY (INHALATION) at 12:19

## 2023-12-01 RX ADMIN — ARFORMOTEROL TARTRATE 15 MCG: 15 SOLUTION RESPIRATORY (INHALATION) at 12:19

## 2023-12-01 RX ADMIN — REMDESIVIR 100 MG: 100 INJECTION, POWDER, LYOPHILIZED, FOR SOLUTION INTRAVENOUS at 00:32

## 2023-12-01 RX ADMIN — LEVOFLOXACIN 500 MG: 5 INJECTION, SOLUTION INTRAVENOUS at 16:59

## 2023-12-01 RX ADMIN — SODIUM CHLORIDE, PRESERVATIVE FREE 10 ML: 5 INJECTION INTRAVENOUS at 20:44

## 2023-12-01 RX ADMIN — ARFORMOTEROL TARTRATE 15 MCG: 15 SOLUTION RESPIRATORY (INHALATION) at 19:57

## 2023-12-01 RX ADMIN — IPRATROPIUM BROMIDE AND ALBUTEROL SULFATE 1 DOSE: .5; 2.5 SOLUTION RESPIRATORY (INHALATION) at 19:57

## 2023-12-01 RX ADMIN — DEXAMETHASONE SODIUM PHOSPHATE 6 MG: 10 INJECTION INTRAMUSCULAR; INTRAVENOUS at 20:43

## 2023-12-01 RX ADMIN — IPRATROPIUM BROMIDE AND ALBUTEROL SULFATE 1 DOSE: .5; 2.5 SOLUTION RESPIRATORY (INHALATION) at 15:38

## 2023-12-01 RX ADMIN — IPRATROPIUM BROMIDE AND ALBUTEROL SULFATE 1 DOSE: .5; 2.5 SOLUTION RESPIRATORY (INHALATION) at 05:21

## 2023-12-01 RX ADMIN — ENOXAPARIN SODIUM 40 MG: 100 INJECTION SUBCUTANEOUS at 08:57

## 2023-12-01 RX ADMIN — DEXAMETHASONE SODIUM PHOSPHATE 6 MG: 10 INJECTION INTRAMUSCULAR; INTRAVENOUS at 08:57

## 2023-12-01 RX ADMIN — CEFEPIME 2000 MG: 2 INJECTION, POWDER, FOR SOLUTION INTRAVENOUS at 08:55

## 2023-12-01 NOTE — CARE COORDINATION
Pt is Covid +, ID and pulm following. Pt is on IVFs, cefepime, decadron, toci and remdesivir. Pt is currently requiring 15l HF and NRB, ABGs pending. Failed video swallow on 11/30. CM met w/dtr Hudgins Peevelin to discuss how pt was doing prior to admission as spouse diverted CM on 11/30 to dtr for all questions/decisions. Kt Renee states her mother and father reside together in a split level home w/lift to bed/bath on the second floor, pt also has a walker. Pt was independent prior to admission. No home O2, cpap or nebulizer. Pt was active w/[facility] prior to admission. PCP is Dr. Hany Mathew. CM provided resources for [facility], SNF, HHC and DME. Kt Renee is agreeable to CM making a referral to [facility] to follow w/referral made to [facility]. CM/SW will continue to follow pt.'s progress and needs.    Eva Goldstein, BSN, RN  [facility] Case Management  [phone]

## 2023-12-02 ENCOUNTER — APPOINTMENT (OUTPATIENT)
Dept: GENERAL RADIOLOGY | Age: 88
DRG: 871 | End: 2023-12-02
Payer: MEDICARE

## 2023-12-02 PROBLEM — I47.19 PAT (PAROXYSMAL ATRIAL TACHYCARDIA): Status: ACTIVE | Noted: 2023-12-02

## 2023-12-02 LAB
ALBUMIN SERPL-MCNC: 2.7 G/DL (ref 3.5–5.2)
ALP SERPL-CCNC: 52 U/L (ref 40–129)
ALT SERPL-CCNC: 14 U/L (ref 0–40)
ANION GAP SERPL CALCULATED.3IONS-SCNC: 17 MMOL/L (ref 7–16)
AST SERPL-CCNC: 27 U/L (ref 0–39)
BILIRUB DIRECT SERPL-MCNC: <0.2 MG/DL (ref 0–0.3)
BILIRUB INDIRECT SERPL-MCNC: ABNORMAL MG/DL (ref 0–1)
BILIRUB SERPL-MCNC: 0.5 MG/DL (ref 0–1.2)
BUN SERPL-MCNC: 36 MG/DL (ref 6–23)
CALCIUM SERPL-MCNC: 7.9 MG/DL (ref 8.6–10.2)
CHLORIDE SERPL-SCNC: 104 MMOL/L (ref 98–107)
CO2 SERPL-SCNC: 21 MMOL/L (ref 22–29)
CREAT SERPL-MCNC: 0.6 MG/DL (ref 0.7–1.2)
CRP SERPL HS-MCNC: 51 MG/L (ref 0–5)
GFR SERPL CREATININE-BSD FRML MDRD: >60 ML/MIN/1.73M2
GLUCOSE BLD-MCNC: 316 MG/DL (ref 74–99)
GLUCOSE BLD-MCNC: 346 MG/DL (ref 74–99)
GLUCOSE BLD-MCNC: 358 MG/DL (ref 74–99)
GLUCOSE SERPL-MCNC: 405 MG/DL (ref 74–99)
LDH SERPL-CCNC: 271 U/L (ref 135–225)
MAGNESIUM SERPL-MCNC: 2 MG/DL (ref 1.6–2.6)
PHOSPHATE SERPL-MCNC: 2.2 MG/DL (ref 2.5–4.5)
POTASSIUM SERPL-SCNC: 3.5 MMOL/L (ref 3.5–5)
PROT SERPL-MCNC: 5.6 G/DL (ref 6.4–8.3)
SODIUM SERPL-SCNC: 142 MMOL/L (ref 132–146)
TRIGL SERPL-MCNC: 116 MG/DL

## 2023-12-02 PROCEDURE — 82962 GLUCOSE BLOOD TEST: CPT

## 2023-12-02 PROCEDURE — APPSS60 APP SPLIT SHARED TIME 46-60 MINUTES

## 2023-12-02 PROCEDURE — 6370000000 HC RX 637 (ALT 250 FOR IP): Performed by: NURSE PRACTITIONER

## 2023-12-02 PROCEDURE — 2500000003 HC RX 250 WO HCPCS: Performed by: NURSE PRACTITIONER

## 2023-12-02 PROCEDURE — 2700000000 HC OXYGEN THERAPY PER DAY

## 2023-12-02 PROCEDURE — 6360000002 HC RX W HCPCS

## 2023-12-02 PROCEDURE — 83735 ASSAY OF MAGNESIUM: CPT

## 2023-12-02 PROCEDURE — 84100 ASSAY OF PHOSPHORUS: CPT

## 2023-12-02 PROCEDURE — 82248 BILIRUBIN DIRECT: CPT

## 2023-12-02 PROCEDURE — 83615 LACTATE (LD) (LDH) ENZYME: CPT

## 2023-12-02 PROCEDURE — 99222 1ST HOSP IP/OBS MODERATE 55: CPT | Performed by: INTERNAL MEDICINE

## 2023-12-02 PROCEDURE — 2580000003 HC RX 258: Performed by: NURSE PRACTITIONER

## 2023-12-02 PROCEDURE — 71045 X-RAY EXAM CHEST 1 VIEW: CPT

## 2023-12-02 PROCEDURE — 6370000000 HC RX 637 (ALT 250 FOR IP): Performed by: INTERNAL MEDICINE

## 2023-12-02 PROCEDURE — 2060000000 HC ICU INTERMEDIATE R&B

## 2023-12-02 PROCEDURE — 94640 AIRWAY INHALATION TREATMENT: CPT

## 2023-12-02 PROCEDURE — 6360000002 HC RX W HCPCS: Performed by: SPECIALIST

## 2023-12-02 PROCEDURE — 2580000003 HC RX 258

## 2023-12-02 PROCEDURE — 80053 COMPREHEN METABOLIC PANEL: CPT

## 2023-12-02 PROCEDURE — 2580000003 HC RX 258: Performed by: SPECIALIST

## 2023-12-02 PROCEDURE — 94668 MNPJ CHEST WALL SBSQ: CPT

## 2023-12-02 PROCEDURE — 86140 C-REACTIVE PROTEIN: CPT

## 2023-12-02 PROCEDURE — 84478 ASSAY OF TRIGLYCERIDES: CPT

## 2023-12-02 RX ORDER — POTASSIUM CHLORIDE 20 MEQ/1
40 TABLET, EXTENDED RELEASE ORAL PRN
Status: DISCONTINUED | OUTPATIENT
Start: 2023-12-02 | End: 2023-12-07 | Stop reason: HOSPADM

## 2023-12-02 RX ORDER — INSULIN LISPRO 100 [IU]/ML
0-4 INJECTION, SOLUTION INTRAVENOUS; SUBCUTANEOUS
Status: DISCONTINUED | OUTPATIENT
Start: 2023-12-02 | End: 2023-12-04

## 2023-12-02 RX ORDER — DEXTROSE MONOHYDRATE 100 MG/ML
INJECTION, SOLUTION INTRAVENOUS CONTINUOUS PRN
Status: DISCONTINUED | OUTPATIENT
Start: 2023-12-02 | End: 2023-12-07 | Stop reason: HOSPADM

## 2023-12-02 RX ORDER — MAGNESIUM SULFATE IN WATER 40 MG/ML
2000 INJECTION, SOLUTION INTRAVENOUS PRN
Status: DISCONTINUED | OUTPATIENT
Start: 2023-12-02 | End: 2023-12-07 | Stop reason: HOSPADM

## 2023-12-02 RX ORDER — POTASSIUM CHLORIDE 7.45 MG/ML
10 INJECTION INTRAVENOUS PRN
Status: DISCONTINUED | OUTPATIENT
Start: 2023-12-02 | End: 2023-12-07 | Stop reason: HOSPADM

## 2023-12-02 RX ORDER — 0.9 % SODIUM CHLORIDE 0.9 %
250 INTRAVENOUS SOLUTION INTRAVENOUS ONCE
Status: COMPLETED | OUTPATIENT
Start: 2023-12-02 | End: 2023-12-02

## 2023-12-02 RX ORDER — INSULIN LISPRO 100 [IU]/ML
0-4 INJECTION, SOLUTION INTRAVENOUS; SUBCUTANEOUS NIGHTLY
Status: DISCONTINUED | OUTPATIENT
Start: 2023-12-02 | End: 2023-12-04

## 2023-12-02 RX ADMIN — LEVOFLOXACIN 500 MG: 5 INJECTION, SOLUTION INTRAVENOUS at 18:39

## 2023-12-02 RX ADMIN — SODIUM PHOSPHATE, MONOBASIC, MONOHYDRATE AND SODIUM PHOSPHATE, DIBASIC, ANHYDROUS 15 MMOL: 142; 276 INJECTION, SOLUTION INTRAVENOUS at 16:34

## 2023-12-02 RX ADMIN — SODIUM CHLORIDE, PRESERVATIVE FREE 10 ML: 5 INJECTION INTRAVENOUS at 08:13

## 2023-12-02 RX ADMIN — DEXAMETHASONE SODIUM PHOSPHATE 6 MG: 10 INJECTION INTRAMUSCULAR; INTRAVENOUS at 20:04

## 2023-12-02 RX ADMIN — IPRATROPIUM BROMIDE AND ALBUTEROL SULFATE 1 DOSE: .5; 2.5 SOLUTION RESPIRATORY (INHALATION) at 19:24

## 2023-12-02 RX ADMIN — IPRATROPIUM BROMIDE AND ALBUTEROL SULFATE 1 DOSE: .5; 2.5 SOLUTION RESPIRATORY (INHALATION) at 04:39

## 2023-12-02 RX ADMIN — ENOXAPARIN SODIUM 40 MG: 100 INJECTION SUBCUTANEOUS at 08:13

## 2023-12-02 RX ADMIN — IPRATROPIUM BROMIDE AND ALBUTEROL SULFATE 1 DOSE: .5; 2.5 SOLUTION RESPIRATORY (INHALATION) at 11:58

## 2023-12-02 RX ADMIN — SODIUM CHLORIDE 250 ML: 9 INJECTION, SOLUTION INTRAVENOUS at 20:45

## 2023-12-02 RX ADMIN — ARFORMOTEROL TARTRATE 15 MCG: 15 SOLUTION RESPIRATORY (INHALATION) at 19:24

## 2023-12-02 RX ADMIN — DEXAMETHASONE SODIUM PHOSPHATE 6 MG: 10 INJECTION INTRAMUSCULAR; INTRAVENOUS at 08:13

## 2023-12-02 RX ADMIN — IPRATROPIUM BROMIDE AND ALBUTEROL SULFATE 1 DOSE: .5; 2.5 SOLUTION RESPIRATORY (INHALATION) at 08:35

## 2023-12-02 RX ADMIN — PETROLATUM: 420 OINTMENT TOPICAL at 20:08

## 2023-12-02 RX ADMIN — SODIUM CHLORIDE, PRESERVATIVE FREE 10 ML: 5 INJECTION INTRAVENOUS at 20:08

## 2023-12-02 RX ADMIN — INSULIN LISPRO 3 UNITS: 100 INJECTION, SOLUTION INTRAVENOUS; SUBCUTANEOUS at 16:41

## 2023-12-02 RX ADMIN — REMDESIVIR 100 MG: 100 INJECTION, POWDER, LYOPHILIZED, FOR SOLUTION INTRAVENOUS at 00:21

## 2023-12-02 RX ADMIN — CEFEPIME 2000 MG: 2 INJECTION, POWDER, FOR SOLUTION INTRAVENOUS at 20:03

## 2023-12-02 RX ADMIN — INSULIN LISPRO 4 UNITS: 100 INJECTION, SOLUTION INTRAVENOUS; SUBCUTANEOUS at 20:05

## 2023-12-02 RX ADMIN — REMDESIVIR 100 MG: 100 INJECTION, POWDER, LYOPHILIZED, FOR SOLUTION INTRAVENOUS at 23:39

## 2023-12-02 RX ADMIN — ARFORMOTEROL TARTRATE 15 MCG: 15 SOLUTION RESPIRATORY (INHALATION) at 08:35

## 2023-12-02 RX ADMIN — IPRATROPIUM BROMIDE AND ALBUTEROL SULFATE 1 DOSE: .5; 2.5 SOLUTION RESPIRATORY (INHALATION) at 00:45

## 2023-12-02 RX ADMIN — CEFEPIME 2000 MG: 2 INJECTION, POWDER, FOR SOLUTION INTRAVENOUS at 05:26

## 2023-12-02 RX ADMIN — PETROLATUM: 420 OINTMENT TOPICAL at 08:13

## 2023-12-02 ASSESSMENT — PAIN SCALES - GENERAL: PAINLEVEL_OUTOF10: 5

## 2023-12-02 ASSESSMENT — PAIN DESCRIPTION - LOCATION: LOCATION: GENERALIZED

## 2023-12-03 ENCOUNTER — APPOINTMENT (OUTPATIENT)
Age: 88
DRG: 871 | End: 2023-12-03
Payer: MEDICARE

## 2023-12-03 LAB
ALBUMIN SERPL-MCNC: 2.8 G/DL (ref 3.5–5.2)
ALP SERPL-CCNC: 47 U/L (ref 40–129)
ALT SERPL-CCNC: 17 U/L (ref 0–40)
ANION GAP SERPL CALCULATED.3IONS-SCNC: 15 MMOL/L (ref 7–16)
AST SERPL-CCNC: 29 U/L (ref 0–39)
ATYPICAL LYMPHOCYTE ABSOLUTE COUNT: 0.74 K/UL (ref 0–0.46)
ATYPICAL LYMPHOCYTES: 3 % (ref 0–4)
BASOPHILS # BLD: 0 K/UL (ref 0–0.2)
BASOPHILS NFR BLD: 0 % (ref 0–2)
BILIRUB DIRECT SERPL-MCNC: 0.2 MG/DL (ref 0–0.3)
BILIRUB INDIRECT SERPL-MCNC: 0.4 MG/DL (ref 0–1)
BILIRUB SERPL-MCNC: 0.6 MG/DL (ref 0–1.2)
BUN SERPL-MCNC: 38 MG/DL (ref 6–23)
CALCIUM SERPL-MCNC: 8 MG/DL (ref 8.6–10.2)
CHLORIDE SERPL-SCNC: 113 MMOL/L (ref 98–107)
CO2 SERPL-SCNC: 22 MMOL/L (ref 22–29)
CREAT SERPL-MCNC: 0.6 MG/DL (ref 0.7–1.2)
EOSINOPHIL # BLD: 0 K/UL (ref 0.05–0.5)
EOSINOPHILS RELATIVE PERCENT: 0 % (ref 0–6)
ERYTHROCYTE [DISTWIDTH] IN BLOOD BY AUTOMATED COUNT: 15 % (ref 11.5–15)
GFR SERPL CREATININE-BSD FRML MDRD: >60 ML/MIN/1.73M2
GLUCOSE BLD-MCNC: 268 MG/DL (ref 74–99)
GLUCOSE BLD-MCNC: 273 MG/DL (ref 74–99)
GLUCOSE BLD-MCNC: 328 MG/DL (ref 74–99)
GLUCOSE BLD-MCNC: 340 MG/DL (ref 74–99)
GLUCOSE SERPL-MCNC: 325 MG/DL (ref 74–99)
HCT VFR BLD AUTO: 33.8 % (ref 37–54)
HGB BLD-MCNC: 11 G/DL (ref 12.5–16.5)
LYMPHOCYTES NFR BLD: 11.61 K/UL (ref 1.5–4)
LYMPHOCYTES RELATIVE PERCENT: 47 % (ref 20–42)
MAGNESIUM SERPL-MCNC: 2 MG/DL (ref 1.6–2.6)
MCH RBC QN AUTO: 31.3 PG (ref 26–35)
MCHC RBC AUTO-ENTMCNC: 32.5 G/DL (ref 32–34.5)
MCV RBC AUTO: 96.3 FL (ref 80–99.9)
MONOCYTES NFR BLD: 0.49 K/UL (ref 0.1–0.95)
MONOCYTES NFR BLD: 2 % (ref 2–12)
NEUTROPHILS NFR BLD: 48 % (ref 43–80)
NEUTS SEG NFR BLD: 11.86 K/UL (ref 1.8–7.3)
PHOSPHATE SERPL-MCNC: 2.8 MG/DL (ref 2.5–4.5)
PLATELET # BLD AUTO: 279 K/UL (ref 130–450)
PMV BLD AUTO: 9 FL (ref 7–12)
POTASSIUM SERPL-SCNC: 3.9 MMOL/L (ref 3.5–5)
PROT SERPL-MCNC: 5.4 G/DL (ref 6.4–8.3)
RBC # BLD AUTO: 3.51 M/UL (ref 3.8–5.8)
RBC # BLD: NORMAL 10*6/UL
SODIUM SERPL-SCNC: 150 MMOL/L (ref 132–146)
WBC OTHER # BLD: 24.7 K/UL (ref 4.5–11.5)

## 2023-12-03 PROCEDURE — 82248 BILIRUBIN DIRECT: CPT

## 2023-12-03 PROCEDURE — 2580000003 HC RX 258: Performed by: SPECIALIST

## 2023-12-03 PROCEDURE — 6360000002 HC RX W HCPCS: Performed by: INTERNAL MEDICINE

## 2023-12-03 PROCEDURE — 6360000002 HC RX W HCPCS: Performed by: SPECIALIST

## 2023-12-03 PROCEDURE — 2060000000 HC ICU INTERMEDIATE R&B

## 2023-12-03 PROCEDURE — 99233 SBSQ HOSP IP/OBS HIGH 50: CPT | Performed by: INTERNAL MEDICINE

## 2023-12-03 PROCEDURE — 6360000002 HC RX W HCPCS

## 2023-12-03 PROCEDURE — 6370000000 HC RX 637 (ALT 250 FOR IP): Performed by: NURSE PRACTITIONER

## 2023-12-03 PROCEDURE — 82962 GLUCOSE BLOOD TEST: CPT

## 2023-12-03 PROCEDURE — 83735 ASSAY OF MAGNESIUM: CPT

## 2023-12-03 PROCEDURE — 6370000000 HC RX 637 (ALT 250 FOR IP): Performed by: INTERNAL MEDICINE

## 2023-12-03 PROCEDURE — 2580000003 HC RX 258: Performed by: INTERNAL MEDICINE

## 2023-12-03 PROCEDURE — 84100 ASSAY OF PHOSPHORUS: CPT

## 2023-12-03 PROCEDURE — 85025 COMPLETE CBC W/AUTO DIFF WBC: CPT

## 2023-12-03 PROCEDURE — 2700000000 HC OXYGEN THERAPY PER DAY

## 2023-12-03 PROCEDURE — 94640 AIRWAY INHALATION TREATMENT: CPT

## 2023-12-03 PROCEDURE — 2580000003 HC RX 258

## 2023-12-03 PROCEDURE — 80053 COMPREHEN METABOLIC PANEL: CPT

## 2023-12-03 PROCEDURE — 93306 TTE W/DOPPLER COMPLETE: CPT

## 2023-12-03 RX ORDER — FUROSEMIDE 10 MG/ML
20 INJECTION INTRAMUSCULAR; INTRAVENOUS ONCE
Status: COMPLETED | OUTPATIENT
Start: 2023-12-03 | End: 2023-12-03

## 2023-12-03 RX ADMIN — INSULIN LISPRO 3 UNITS: 100 INJECTION, SOLUTION INTRAVENOUS; SUBCUTANEOUS at 12:28

## 2023-12-03 RX ADMIN — SODIUM CHLORIDE, PRESERVATIVE FREE 10 ML: 5 INJECTION INTRAVENOUS at 22:29

## 2023-12-03 RX ADMIN — CEFEPIME 2000 MG: 2 INJECTION, POWDER, FOR SOLUTION INTRAVENOUS at 19:03

## 2023-12-03 RX ADMIN — ARFORMOTEROL TARTRATE 15 MCG: 15 SOLUTION RESPIRATORY (INHALATION) at 19:39

## 2023-12-03 RX ADMIN — ENOXAPARIN SODIUM 40 MG: 100 INJECTION SUBCUTANEOUS at 09:15

## 2023-12-03 RX ADMIN — IPRATROPIUM BROMIDE AND ALBUTEROL SULFATE 1 DOSE: .5; 2.5 SOLUTION RESPIRATORY (INHALATION) at 09:21

## 2023-12-03 RX ADMIN — INSULIN LISPRO 3 UNITS: 100 INJECTION, SOLUTION INTRAVENOUS; SUBCUTANEOUS at 09:15

## 2023-12-03 RX ADMIN — LEVOFLOXACIN 500 MG: 5 INJECTION, SOLUTION INTRAVENOUS at 16:54

## 2023-12-03 RX ADMIN — IPRATROPIUM BROMIDE AND ALBUTEROL SULFATE 1 DOSE: .5; 2.5 SOLUTION RESPIRATORY (INHALATION) at 13:42

## 2023-12-03 RX ADMIN — CEFEPIME 2000 MG: 2 INJECTION, POWDER, FOR SOLUTION INTRAVENOUS at 05:42

## 2023-12-03 RX ADMIN — FUROSEMIDE 20 MG: 10 INJECTION, SOLUTION INTRAMUSCULAR; INTRAVENOUS at 03:46

## 2023-12-03 RX ADMIN — ARFORMOTEROL TARTRATE 15 MCG: 15 SOLUTION RESPIRATORY (INHALATION) at 09:21

## 2023-12-03 RX ADMIN — INSULIN LISPRO 2 UNITS: 100 INJECTION, SOLUTION INTRAVENOUS; SUBCUTANEOUS at 16:41

## 2023-12-03 RX ADMIN — IPRATROPIUM BROMIDE AND ALBUTEROL SULFATE 1 DOSE: .5; 2.5 SOLUTION RESPIRATORY (INHALATION) at 19:39

## 2023-12-03 RX ADMIN — DEXAMETHASONE SODIUM PHOSPHATE 6 MG: 10 INJECTION INTRAMUSCULAR; INTRAVENOUS at 09:15

## 2023-12-03 RX ADMIN — IPRATROPIUM BROMIDE AND ALBUTEROL SULFATE 1 DOSE: .5; 2.5 SOLUTION RESPIRATORY (INHALATION) at 15:58

## 2023-12-03 RX ADMIN — DEXAMETHASONE SODIUM PHOSPHATE 6 MG: 10 INJECTION INTRAMUSCULAR; INTRAVENOUS at 22:28

## 2023-12-03 RX ADMIN — SODIUM CHLORIDE, PRESERVATIVE FREE 10 ML: 5 INJECTION INTRAVENOUS at 09:14

## 2023-12-03 RX ADMIN — IPRATROPIUM BROMIDE AND ALBUTEROL SULFATE 1 DOSE: .5; 2.5 SOLUTION RESPIRATORY (INHALATION) at 04:41

## 2023-12-03 RX ADMIN — VANCOMYCIN HYDROCHLORIDE 1000 MG: 1 INJECTION, POWDER, LYOPHILIZED, FOR SOLUTION INTRAVENOUS at 03:46

## 2023-12-03 RX ADMIN — IPRATROPIUM BROMIDE AND ALBUTEROL SULFATE 1 DOSE: .5; 2.5 SOLUTION RESPIRATORY (INHALATION) at 01:00

## 2023-12-03 ASSESSMENT — PAIN SCALES - GENERAL
PAINLEVEL_OUTOF10: 0
PAINLEVEL_OUTOF10: 0

## 2023-12-03 NOTE — PLAN OF CARE
Problem: ABCDS Injury Assessment  Goal: Absence of physical injury  Outcome: Progressing     Problem: Skin/Tissue Integrity  Goal: Absence of new skin breakdown  Description: 1. Monitor for areas of redness and/or skin breakdown  2. Assess vascular access sites hourly  3. Every 4-6 hours minimum:  Change oxygen saturation probe site  4. Every 4-6 hours:  If on nasal continuous positive airway pressure, respiratory therapy assess nares and determine need for appliance change or resting period.   Outcome: Progressing     Problem: Safety - Adult  Goal: Free from fall injury  Outcome: Progressing     Problem: Nutrition Deficit:  Goal: Optimize nutritional status  Outcome: Progressing     Problem: Pain  Goal: Verbalizes/displays adequate comfort level or baseline comfort level  Outcome: Progressing

## 2023-12-04 LAB
ALBUMIN SERPL-MCNC: 2.8 G/DL (ref 3.5–5.2)
ALP SERPL-CCNC: 44 U/L (ref 40–129)
ALT SERPL-CCNC: 17 U/L (ref 0–40)
ANION GAP SERPL CALCULATED.3IONS-SCNC: 14 MMOL/L (ref 7–16)
AST SERPL-CCNC: 21 U/L (ref 0–39)
BASOPHILS # BLD: 0 K/UL (ref 0–0.2)
BASOPHILS NFR BLD: 0 % (ref 0–2)
BILIRUB DIRECT SERPL-MCNC: 0.5 MG/DL (ref 0–0.3)
BILIRUB INDIRECT SERPL-MCNC: 0.5 MG/DL (ref 0–1)
BILIRUB SERPL-MCNC: 1 MG/DL (ref 0–1.2)
BUN SERPL-MCNC: 41 MG/DL (ref 6–23)
CALCIUM SERPL-MCNC: 8.1 MG/DL (ref 8.6–10.2)
CHLORIDE SERPL-SCNC: 114 MMOL/L (ref 98–107)
CO2 SERPL-SCNC: 24 MMOL/L (ref 22–29)
CREAT SERPL-MCNC: 0.6 MG/DL (ref 0.7–1.2)
CRP SERPL HS-MCNC: 20 MG/L (ref 0–5)
ECHO AO ASC DIAM: 3 CM
ECHO AO ASCENDING AORTA INDEX: 1.65 CM/M2
ECHO AO SINUS VALSALVA DIAM: 2.9 CM
ECHO AO SINUS VALSALVA INDEX: 1.59 CM/M2
ECHO AV AREA PEAK VELOCITY: 1 CM2
ECHO AV AREA VTI: 1 CM2
ECHO AV AREA/BSA PEAK VELOCITY: 0.5 CM2/M2
ECHO AV AREA/BSA VTI: 0.5 CM2/M2
ECHO AV MEAN GRADIENT: 12 MMHG
ECHO AV MEAN VELOCITY: 1.6 M/S
ECHO AV PEAK GRADIENT: 23 MMHG
ECHO AV PEAK VELOCITY: 2.4 M/S
ECHO AV VELOCITY RATIO: 0.33
ECHO AV VTI: 39.9 CM
ECHO BSA: 1.66 M2
ECHO EST RA PRESSURE: 8 MMHG
ECHO LA DIAMETER INDEX: 2.09 CM/M2
ECHO LA DIAMETER: 3.8 CM
ECHO LA VOL A-L A2C: 91 ML (ref 18–58)
ECHO LA VOL A-L A4C: 60 ML (ref 18–58)
ECHO LA VOL MOD A2C: 85 ML (ref 18–58)
ECHO LA VOL MOD A4C: 58 ML (ref 18–58)
ECHO LA VOLUME AREA LENGTH: 79 ML
ECHO LA VOLUME INDEX A-L A2C: 50 ML/M2 (ref 16–34)
ECHO LA VOLUME INDEX A-L A4C: 33 ML/M2 (ref 16–34)
ECHO LA VOLUME INDEX AREA LENGTH: 43 ML/M2 (ref 16–34)
ECHO LA VOLUME INDEX MOD A2C: 47 ML/M2 (ref 16–34)
ECHO LA VOLUME INDEX MOD A4C: 32 ML/M2 (ref 16–34)
ECHO LV E' LATERAL VELOCITY: 14 CM/S
ECHO LV E' SEPTAL VELOCITY: 10 CM/S
ECHO LV EDV A2C: 104 ML
ECHO LV EDV A4C: 102 ML
ECHO LV EDV BP: 105 ML (ref 67–155)
ECHO LV EDV INDEX A4C: 56 ML/M2
ECHO LV EDV INDEX BP: 58 ML/M2
ECHO LV EDV NDEX A2C: 57 ML/M2
ECHO LV EJECTION FRACTION A2C: 30 %
ECHO LV EJECTION FRACTION A4C: 32 %
ECHO LV EJECTION FRACTION BIPLANE: 32 % (ref 55–100)
ECHO LV ESV A2C: 73 ML
ECHO LV ESV A4C: 70 ML
ECHO LV ESV BP: 71 ML (ref 22–58)
ECHO LV ESV INDEX A2C: 40 ML/M2
ECHO LV ESV INDEX A4C: 38 ML/M2
ECHO LV ESV INDEX BP: 39 ML/M2
ECHO LV FRACTIONAL SHORTENING: 15 % (ref 28–44)
ECHO LV INTERNAL DIMENSION DIASTOLE INDEX: 2.53 CM/M2
ECHO LV INTERNAL DIMENSION DIASTOLIC: 4.6 CM (ref 4.2–5.9)
ECHO LV INTERNAL DIMENSION SYSTOLIC INDEX: 2.14 CM/M2
ECHO LV INTERNAL DIMENSION SYSTOLIC: 3.9 CM
ECHO LV IVSD: 1.5 CM (ref 0.6–1)
ECHO LV IVSS: 1.6 CM
ECHO LV MASS 2D: 256.8 G (ref 88–224)
ECHO LV MASS INDEX 2D: 141.1 G/M2 (ref 49–115)
ECHO LV POSTERIOR WALL DIASTOLIC: 1.3 CM (ref 0.6–1)
ECHO LV POSTERIOR WALL SYSTOLIC: 1.4 CM
ECHO LV RELATIVE WALL THICKNESS RATIO: 0.57
ECHO LVOT AREA: 3.1 CM2
ECHO LVOT AV VTI INDEX: 0.3
ECHO LVOT DIAM: 2 CM
ECHO LVOT MEAN GRADIENT: 1 MMHG
ECHO LVOT PEAK GRADIENT: 3 MMHG
ECHO LVOT PEAK VELOCITY: 0.8 M/S
ECHO LVOT STROKE VOLUME INDEX: 20.9 ML/M2
ECHO LVOT SV: 38 ML
ECHO LVOT VTI: 12.1 CM
ECHO MV AREA PHT: 3.6 CM2
ECHO MV AREA VTI: 1.4 CM2
ECHO MV E DECELERATION TIME (DT): 160.9 MS
ECHO MV EROA PISA: 0.1 CM2
ECHO MV LVOT VTI INDEX: 2.3
ECHO MV MAX VELOCITY: 1.5 M/S
ECHO MV MEAN GRADIENT: 3 MMHG
ECHO MV MEAN VELOCITY: 0.7 M/S
ECHO MV PEAK GRADIENT: 9 MMHG
ECHO MV PRESSURE HALF TIME (PHT): 61 MS
ECHO MV REGURGITANT ALIASING (NYQUIST) VELOCITY: 34 CM/S
ECHO MV REGURGITANT RADIUS PISA: 0.59 CM
ECHO MV REGURGITANT VELOCITY PISA: 5.5 M/S
ECHO MV REGURGITANT VOLUME PISA: 23.55 ML
ECHO MV REGURGITANT VTIA: 174.3 CM
ECHO MV VTI: 27.8 CM
ECHO RIGHT VENTRICULAR SYSTOLIC PRESSURE (RVSP): 47 MMHG
ECHO RV INTERNAL DIMENSION: 2.1 CM
ECHO RV TAPSE: 2.1 CM (ref 1.7–?)
ECHO TV REGURGITANT MAX VELOCITY: 3.13 M/S
ECHO TV REGURGITANT PEAK GRADIENT: 39 MMHG
EOSINOPHIL # BLD: 0 K/UL (ref 0.05–0.5)
EOSINOPHILS RELATIVE PERCENT: 0 % (ref 0–6)
ERYTHROCYTE [DISTWIDTH] IN BLOOD BY AUTOMATED COUNT: 15 % (ref 11.5–15)
GFR SERPL CREATININE-BSD FRML MDRD: >60 ML/MIN/1.73M2
GLUCOSE BLD-MCNC: 303 MG/DL (ref 74–99)
GLUCOSE BLD-MCNC: 310 MG/DL (ref 74–99)
GLUCOSE BLD-MCNC: 357 MG/DL (ref 74–99)
GLUCOSE BLD-MCNC: 88 MG/DL (ref 74–99)
GLUCOSE BLD-MCNC: 97 MG/DL (ref 74–99)
GLUCOSE SERPL-MCNC: 382 MG/DL (ref 74–99)
HCT VFR BLD AUTO: 35.9 % (ref 37–54)
HGB BLD-MCNC: 11.2 G/DL (ref 12.5–16.5)
LDH SERPL-CCNC: 323 U/L (ref 135–225)
LYMPHOCYTES NFR BLD: 10.08 K/UL (ref 1.5–4)
LYMPHOCYTES RELATIVE PERCENT: 40 % (ref 20–42)
MAGNESIUM SERPL-MCNC: 2.2 MG/DL (ref 1.6–2.6)
MCH RBC QN AUTO: 31.4 PG (ref 26–35)
MCHC RBC AUTO-ENTMCNC: 31.2 G/DL (ref 32–34.5)
MCV RBC AUTO: 100.6 FL (ref 80–99.9)
MICROORGANISM SPEC CULT: NORMAL
MICROORGANISM SPEC CULT: NORMAL
MONOCYTES NFR BLD: 0.76 K/UL (ref 0.1–0.95)
MONOCYTES NFR BLD: 3 % (ref 2–12)
NEUTROPHILS NFR BLD: 57 % (ref 43–80)
NEUTS SEG NFR BLD: 14.36 K/UL (ref 1.8–7.3)
PHOSPHATE SERPL-MCNC: 2.6 MG/DL (ref 2.5–4.5)
PLATELET # BLD AUTO: 274 K/UL (ref 130–450)
PLATELET ESTIMATE: ABNORMAL
PMV BLD AUTO: 9.6 FL (ref 7–12)
POTASSIUM SERPL-SCNC: 3.7 MMOL/L (ref 3.5–5)
PROT SERPL-MCNC: 5.2 G/DL (ref 6.4–8.3)
RBC # BLD AUTO: 3.57 M/UL (ref 3.8–5.8)
RBC # BLD: ABNORMAL 10*6/UL
RBC # BLD: ABNORMAL 10*6/UL
SERVICE CMNT-IMP: NORMAL
SERVICE CMNT-IMP: NORMAL
SODIUM SERPL-SCNC: 152 MMOL/L (ref 132–146)
SPECIMEN DESCRIPTION: NORMAL
SPECIMEN DESCRIPTION: NORMAL
WBC OTHER # BLD: 25.2 K/UL (ref 4.5–11.5)

## 2023-12-04 PROCEDURE — 84100 ASSAY OF PHOSPHORUS: CPT

## 2023-12-04 PROCEDURE — 99233 SBSQ HOSP IP/OBS HIGH 50: CPT | Performed by: NURSE PRACTITIONER

## 2023-12-04 PROCEDURE — 6360000002 HC RX W HCPCS

## 2023-12-04 PROCEDURE — 6360000002 HC RX W HCPCS: Performed by: SPECIALIST

## 2023-12-04 PROCEDURE — 2580000003 HC RX 258

## 2023-12-04 PROCEDURE — 82962 GLUCOSE BLOOD TEST: CPT

## 2023-12-04 PROCEDURE — 80053 COMPREHEN METABOLIC PANEL: CPT

## 2023-12-04 PROCEDURE — 97530 THERAPEUTIC ACTIVITIES: CPT

## 2023-12-04 PROCEDURE — 85025 COMPLETE CBC W/AUTO DIFF WBC: CPT

## 2023-12-04 PROCEDURE — 36415 COLL VENOUS BLD VENIPUNCTURE: CPT

## 2023-12-04 PROCEDURE — 6360000002 HC RX W HCPCS: Performed by: NURSE PRACTITIONER

## 2023-12-04 PROCEDURE — P9047 ALBUMIN (HUMAN), 25%, 50ML: HCPCS | Performed by: FAMILY MEDICINE

## 2023-12-04 PROCEDURE — 94669 MECHANICAL CHEST WALL OSCILL: CPT

## 2023-12-04 PROCEDURE — 2580000003 HC RX 258: Performed by: SPECIALIST

## 2023-12-04 PROCEDURE — 82248 BILIRUBIN DIRECT: CPT

## 2023-12-04 PROCEDURE — 2700000000 HC OXYGEN THERAPY PER DAY

## 2023-12-04 PROCEDURE — 2060000000 HC ICU INTERMEDIATE R&B

## 2023-12-04 PROCEDURE — 97110 THERAPEUTIC EXERCISES: CPT

## 2023-12-04 PROCEDURE — 94640 AIRWAY INHALATION TREATMENT: CPT

## 2023-12-04 PROCEDURE — 6370000000 HC RX 637 (ALT 250 FOR IP): Performed by: NURSE PRACTITIONER

## 2023-12-04 PROCEDURE — 86140 C-REACTIVE PROTEIN: CPT

## 2023-12-04 PROCEDURE — 6370000000 HC RX 637 (ALT 250 FOR IP): Performed by: INTERNAL MEDICINE

## 2023-12-04 PROCEDURE — 99233 SBSQ HOSP IP/OBS HIGH 50: CPT | Performed by: INTERNAL MEDICINE

## 2023-12-04 PROCEDURE — 93306 TTE W/DOPPLER COMPLETE: CPT | Performed by: INTERNAL MEDICINE

## 2023-12-04 PROCEDURE — 6370000000 HC RX 637 (ALT 250 FOR IP): Performed by: FAMILY MEDICINE

## 2023-12-04 PROCEDURE — 83615 LACTATE (LD) (LDH) ENZYME: CPT

## 2023-12-04 PROCEDURE — 6360000002 HC RX W HCPCS: Performed by: FAMILY MEDICINE

## 2023-12-04 PROCEDURE — 83735 ASSAY OF MAGNESIUM: CPT

## 2023-12-04 RX ORDER — INSULIN LISPRO 100 [IU]/ML
0-4 INJECTION, SOLUTION INTRAVENOUS; SUBCUTANEOUS NIGHTLY
Status: DISCONTINUED | OUTPATIENT
Start: 2023-12-04 | End: 2023-12-07 | Stop reason: HOSPADM

## 2023-12-04 RX ORDER — LIDOCAINE 4 G/G
1 PATCH TOPICAL DAILY
Status: DISCONTINUED | OUTPATIENT
Start: 2023-12-05 | End: 2023-12-07 | Stop reason: HOSPADM

## 2023-12-04 RX ORDER — ALBUMIN (HUMAN) 12.5 G/50ML
50 SOLUTION INTRAVENOUS ONCE
Status: COMPLETED | OUTPATIENT
Start: 2023-12-05 | End: 2023-12-05

## 2023-12-04 RX ORDER — LEVALBUTEROL INHALATION SOLUTION 0.31 MG/3ML
0.31 SOLUTION RESPIRATORY (INHALATION)
Status: DISCONTINUED | OUTPATIENT
Start: 2023-12-04 | End: 2023-12-07 | Stop reason: HOSPADM

## 2023-12-04 RX ORDER — DEXAMETHASONE SODIUM PHOSPHATE 10 MG/ML
6 INJECTION INTRAMUSCULAR; INTRAVENOUS EVERY 24 HOURS
Status: DISCONTINUED | OUTPATIENT
Start: 2023-12-05 | End: 2023-12-07 | Stop reason: HOSPADM

## 2023-12-04 RX ORDER — ALBUMIN (HUMAN) 12.5 G/50ML
50 SOLUTION INTRAVENOUS ONCE
Status: COMPLETED | OUTPATIENT
Start: 2023-12-04 | End: 2023-12-04

## 2023-12-04 RX ORDER — INSULIN LISPRO 100 [IU]/ML
0-16 INJECTION, SOLUTION INTRAVENOUS; SUBCUTANEOUS
Status: DISCONTINUED | OUTPATIENT
Start: 2023-12-04 | End: 2023-12-07 | Stop reason: HOSPADM

## 2023-12-04 RX ADMIN — LEVOFLOXACIN 500 MG: 5 INJECTION, SOLUTION INTRAVENOUS at 18:15

## 2023-12-04 RX ADMIN — IPRATROPIUM BROMIDE AND ALBUTEROL SULFATE 1 DOSE: .5; 2.5 SOLUTION RESPIRATORY (INHALATION) at 09:51

## 2023-12-04 RX ADMIN — LEVALBUTEROL HYDROCHLORIDE 0.31 MG: 0.31 SOLUTION RESPIRATORY (INHALATION) at 16:31

## 2023-12-04 RX ADMIN — CEFEPIME 2000 MG: 2 INJECTION, POWDER, FOR SOLUTION INTRAVENOUS at 06:02

## 2023-12-04 RX ADMIN — ARFORMOTEROL TARTRATE 15 MCG: 15 SOLUTION RESPIRATORY (INHALATION) at 09:51

## 2023-12-04 RX ADMIN — IPRATROPIUM BROMIDE 0.5 MG: 0.5 SOLUTION RESPIRATORY (INHALATION) at 16:31

## 2023-12-04 RX ADMIN — INSULIN LISPRO 4 UNITS: 100 INJECTION, SOLUTION INTRAVENOUS; SUBCUTANEOUS at 06:12

## 2023-12-04 RX ADMIN — CEFEPIME 2000 MG: 2 INJECTION, POWDER, FOR SOLUTION INTRAVENOUS at 19:45

## 2023-12-04 RX ADMIN — SODIUM CHLORIDE, PRESERVATIVE FREE 10 ML: 5 INJECTION INTRAVENOUS at 08:10

## 2023-12-04 RX ADMIN — INSULIN LISPRO 3 UNITS: 100 INJECTION, SOLUTION INTRAVENOUS; SUBCUTANEOUS at 15:06

## 2023-12-04 RX ADMIN — ARFORMOTEROL TARTRATE 15 MCG: 15 SOLUTION RESPIRATORY (INHALATION) at 21:19

## 2023-12-04 RX ADMIN — IPRATROPIUM BROMIDE AND ALBUTEROL SULFATE 1 DOSE: .5; 2.5 SOLUTION RESPIRATORY (INHALATION) at 00:11

## 2023-12-04 RX ADMIN — PETROLATUM: 420 OINTMENT TOPICAL at 11:48

## 2023-12-04 RX ADMIN — LEVALBUTEROL HYDROCHLORIDE 0.31 MG: 0.31 SOLUTION RESPIRATORY (INHALATION) at 12:28

## 2023-12-04 RX ADMIN — PETROLATUM: 420 OINTMENT TOPICAL at 08:24

## 2023-12-04 RX ADMIN — ALBUMIN (HUMAN) 50 G: 0.25 INJECTION, SOLUTION INTRAVENOUS at 15:05

## 2023-12-04 RX ADMIN — SODIUM CHLORIDE, PRESERVATIVE FREE 20 ML: 5 INJECTION INTRAVENOUS at 18:09

## 2023-12-04 RX ADMIN — LEVALBUTEROL HYDROCHLORIDE 0.31 MG: 0.31 SOLUTION RESPIRATORY (INHALATION) at 21:18

## 2023-12-04 RX ADMIN — INSULIN LISPRO 12 UNITS: 100 INJECTION, SOLUTION INTRAVENOUS; SUBCUTANEOUS at 18:11

## 2023-12-04 RX ADMIN — DEXAMETHASONE SODIUM PHOSPHATE 6 MG: 10 INJECTION INTRAMUSCULAR; INTRAVENOUS at 08:12

## 2023-12-04 RX ADMIN — PETROLATUM: 420 OINTMENT TOPICAL at 20:55

## 2023-12-04 RX ADMIN — IPRATROPIUM BROMIDE AND ALBUTEROL SULFATE 1 DOSE: .5; 2.5 SOLUTION RESPIRATORY (INHALATION) at 03:54

## 2023-12-04 RX ADMIN — IPRATROPIUM BROMIDE 0.5 MG: 0.5 SOLUTION RESPIRATORY (INHALATION) at 21:18

## 2023-12-04 RX ADMIN — REMDESIVIR 100 MG: 100 INJECTION, POWDER, LYOPHILIZED, FOR SOLUTION INTRAVENOUS at 01:08

## 2023-12-04 RX ADMIN — IPRATROPIUM BROMIDE 0.5 MG: 0.5 SOLUTION RESPIRATORY (INHALATION) at 12:28

## 2023-12-04 ASSESSMENT — PAIN DESCRIPTION - PAIN TYPE: TYPE: ACUTE PAIN

## 2023-12-04 ASSESSMENT — PAIN DESCRIPTION - LOCATION: LOCATION: HIP

## 2023-12-04 ASSESSMENT — PAIN DESCRIPTION - DESCRIPTORS: DESCRIPTORS: PATIENT UNABLE TO DESCRIBE

## 2023-12-04 NOTE — FLOWSHEET NOTE
Inpatient Wound Care (Initial consult) [phone] Date: 11/29/2023  1:37 AM    Reason for consult:  buttocks, left arm    Significant history:  per H&P    Chief complaint: Shortness of breath    Past medical history: MI, CAD, hyperlipidemia, Parkinson's disease, rheumatoid arthritis [facility], and myositis. Patient presented to the ED with complaints of shortness of breath. Patient was diagnosed with COVID 1 week ago however since then his shortness of breath has been worsening     Findings:     12/04/23 1111   Wound 11/29/23 Coccyx into left buttock   Date First Assessed/Time First Assessed: 11/29/23 0300   Present on Original Admission: Yes  Location: Coccyx  Wound Description (Comments): into left buttock   Wound Image    Wound Etiology Pressure Stage 2   Dressing/Treatment Pharmaceutical agent (see MAR)  (Aquaphor ordered)   Wound Length (cm) 2.2 cm   Wound Width (cm) 4 cm   Wound Depth (cm) 0.1 cm   Wound Surface Area (cm^2) 8.8 cm^2   Change in Wound Size % (l*w) -486.67   Wound Volume (cm^3) 0.88 cm^3   Wound Healing % -487   Wound Assessment Pink/red   Drainage Amount Scant (moist but unmeasurable)   Drainage Description Serosanguinous   Odor None   Brenda-wound Assessment Fragile;Blanchable erythema   Wound 11/30/23 Arm Right   Date First Assessed/Time First Assessed: 11/30/23 1800   Present on Original Admission: No  Location: Arm  Wound Location Orientation: Right   Wound Image    Dressing Status Reinforced dressing   Wound Assessment Pink/red;Dry;Fibrin   Drainage Amount None (dry)   Brenda-wound Assessment Fragile     Chest and BUE with bruises  Both ischials reddened with blanching  Both heels intact, blanching  Left arm skin tear dry intact scabs    **Informed Consent**    The patient has given verbal consent to have photos taken of wounds and inserted into their chart as part of their permanent medical record for purposes of documentation, treatment management and/or medical review.

## 2023-12-04 NOTE — CARE COORDINATION
Pt is Covid +, surgery following for peg tube placement. Pt remains on cefepime, levaquin and decadron. Awaiting echo. Pt is down to 4l NC. Prior to admission pt was home w/spouse in a split level home w/lift to bed/bath on the second floor, pt also has a walker. Pt was independent prior to admission. No home O2, cpap or nebulizer. Pt was active w/[facility] prior to admission. PCP is Dr. Anya Sandhu.  provided resources for SNF, HHC and DME. Pt no longer has LTAC criteria. Await therapy evals. Will continue to follow.   Lisa Quiroga, BSN, RN  [facility] Case Management  [phone]

## 2023-12-05 ENCOUNTER — APPOINTMENT (OUTPATIENT)
Dept: GENERAL RADIOLOGY | Age: 88
DRG: 871 | End: 2023-12-05
Payer: MEDICARE

## 2023-12-05 LAB
ANION GAP SERPL CALCULATED.3IONS-SCNC: 11 MMOL/L (ref 7–16)
ANION GAP SERPL CALCULATED.3IONS-SCNC: 12 MMOL/L (ref 7–16)
ANION GAP SERPL CALCULATED.3IONS-SCNC: 12 MMOL/L (ref 7–16)
BACTERIA URNS QL MICRO: ABNORMAL
BILIRUB UR QL STRIP: NEGATIVE
BUN SERPL-MCNC: 45 MG/DL (ref 6–23)
BUN SERPL-MCNC: 47 MG/DL (ref 6–23)
BUN SERPL-MCNC: 49 MG/DL (ref 6–23)
CALCIUM SERPL-MCNC: 8.5 MG/DL (ref 8.6–10.2)
CALCIUM SERPL-MCNC: 8.9 MG/DL (ref 8.6–10.2)
CALCIUM SERPL-MCNC: 9 MG/DL (ref 8.6–10.2)
CHLORIDE SERPL-SCNC: 119 MMOL/L (ref 98–107)
CHLORIDE SERPL-SCNC: 122 MMOL/L (ref 98–107)
CHLORIDE SERPL-SCNC: 122 MMOL/L (ref 98–107)
CHLORIDE UR-SCNC: 68 MMOL/L
CLARITY UR: CLEAR
CO2 SERPL-SCNC: 26 MMOL/L (ref 22–29)
CO2 SERPL-SCNC: 27 MMOL/L (ref 22–29)
CO2 SERPL-SCNC: 27 MMOL/L (ref 22–29)
COLOR UR: YELLOW
CREAT SERPL-MCNC: 0.6 MG/DL (ref 0.7–1.2)
CREAT UR-MCNC: 67.7 MG/DL (ref 40–278)
GFR SERPL CREATININE-BSD FRML MDRD: >60 ML/MIN/1.73M2
GLUCOSE BLD-MCNC: 105 MG/DL (ref 74–99)
GLUCOSE BLD-MCNC: 118 MG/DL (ref 74–99)
GLUCOSE BLD-MCNC: 129 MG/DL (ref 74–99)
GLUCOSE BLD-MCNC: 148 MG/DL (ref 74–99)
GLUCOSE BLD-MCNC: 174 MG/DL (ref 74–99)
GLUCOSE BLD-MCNC: 184 MG/DL (ref 74–99)
GLUCOSE BLD-MCNC: 359 MG/DL (ref 74–99)
GLUCOSE SERPL-MCNC: 163 MG/DL (ref 74–99)
GLUCOSE SERPL-MCNC: 200 MG/DL (ref 74–99)
GLUCOSE SERPL-MCNC: 312 MG/DL (ref 74–99)
GLUCOSE UR STRIP-MCNC: NEGATIVE MG/DL
HGB UR QL STRIP.AUTO: NEGATIVE
INR PPP: 1.5
KETONES UR STRIP-MCNC: 15 MG/DL
LEUKOCYTE ESTERASE UR QL STRIP: NEGATIVE
MAGNESIUM SERPL-MCNC: 2.4 MG/DL (ref 1.6–2.6)
NITRITE UR QL STRIP: NEGATIVE
OSMOLALITY UR: 805 MOSM/KG (ref 300–900)
PH UR STRIP: 5.5 [PH] (ref 5–9)
PHOSPHATE SERPL-MCNC: 2.7 MG/DL (ref 2.5–4.5)
POTASSIUM SERPL-SCNC: 3.8 MMOL/L (ref 3.5–5)
POTASSIUM SERPL-SCNC: 4.1 MMOL/L (ref 3.5–5)
POTASSIUM SERPL-SCNC: 4.2 MMOL/L (ref 3.5–5)
PROT UR STRIP-MCNC: ABNORMAL MG/DL
PROTHROMBIN TIME: 16.8 SEC (ref 9.3–12.4)
RBC #/AREA URNS HPF: ABNORMAL /HPF
SODIUM SERPL-SCNC: 156 MMOL/L (ref 132–146)
SODIUM SERPL-SCNC: 158 MMOL/L (ref 132–146)
SODIUM SERPL-SCNC: 160 MMOL/L (ref 132–146)
SODIUM SERPL-SCNC: 160 MMOL/L (ref 132–146)
SODIUM UR-SCNC: 85 MMOL/L
SP GR UR STRIP: 1.02 (ref 1–1.03)
UROBILINOGEN UR STRIP-ACNC: 0.2 EU/DL (ref 0–1)
UUN UR-MCNC: 1432 MG/DL (ref 800–1666)
WBC #/AREA URNS HPF: ABNORMAL /HPF

## 2023-12-05 PROCEDURE — 84100 ASSAY OF PHOSPHORUS: CPT

## 2023-12-05 PROCEDURE — P9047 ALBUMIN (HUMAN), 25%, 50ML: HCPCS | Performed by: FAMILY MEDICINE

## 2023-12-05 PROCEDURE — 6360000002 HC RX W HCPCS: Performed by: FAMILY MEDICINE

## 2023-12-05 PROCEDURE — 82962 GLUCOSE BLOOD TEST: CPT

## 2023-12-05 PROCEDURE — 99233 SBSQ HOSP IP/OBS HIGH 50: CPT | Performed by: INTERNAL MEDICINE

## 2023-12-05 PROCEDURE — 83935 ASSAY OF URINE OSMOLALITY: CPT

## 2023-12-05 PROCEDURE — 2700000000 HC OXYGEN THERAPY PER DAY

## 2023-12-05 PROCEDURE — 6360000002 HC RX W HCPCS

## 2023-12-05 PROCEDURE — 94640 AIRWAY INHALATION TREATMENT: CPT

## 2023-12-05 PROCEDURE — 6360000002 HC RX W HCPCS: Performed by: SPECIALIST

## 2023-12-05 PROCEDURE — 6360000002 HC RX W HCPCS: Performed by: NURSE PRACTITIONER

## 2023-12-05 PROCEDURE — 2580000003 HC RX 258

## 2023-12-05 PROCEDURE — 2060000000 HC ICU INTERMEDIATE R&B

## 2023-12-05 PROCEDURE — 6370000000 HC RX 637 (ALT 250 FOR IP): Performed by: FAMILY MEDICINE

## 2023-12-05 PROCEDURE — 81001 URINALYSIS AUTO W/SCOPE: CPT

## 2023-12-05 PROCEDURE — 2580000003 HC RX 258: Performed by: INTERNAL MEDICINE

## 2023-12-05 PROCEDURE — 84300 ASSAY OF URINE SODIUM: CPT

## 2023-12-05 PROCEDURE — 82436 ASSAY OF URINE CHLORIDE: CPT

## 2023-12-05 PROCEDURE — 2580000003 HC RX 258: Performed by: SPECIALIST

## 2023-12-05 PROCEDURE — 80048 BASIC METABOLIC PNL TOTAL CA: CPT

## 2023-12-05 PROCEDURE — 84295 ASSAY OF SERUM SODIUM: CPT

## 2023-12-05 PROCEDURE — 82570 ASSAY OF URINE CREATININE: CPT

## 2023-12-05 PROCEDURE — 83735 ASSAY OF MAGNESIUM: CPT

## 2023-12-05 PROCEDURE — 84540 ASSAY OF URINE/UREA-N: CPT

## 2023-12-05 PROCEDURE — 85610 PROTHROMBIN TIME: CPT

## 2023-12-05 PROCEDURE — 99231 SBSQ HOSP IP/OBS SF/LOW 25: CPT | Performed by: NURSE PRACTITIONER

## 2023-12-05 PROCEDURE — 71045 X-RAY EXAM CHEST 1 VIEW: CPT

## 2023-12-05 PROCEDURE — 94669 MECHANICAL CHEST WALL OSCILL: CPT

## 2023-12-05 PROCEDURE — 36415 COLL VENOUS BLD VENIPUNCTURE: CPT

## 2023-12-05 PROCEDURE — 6370000000 HC RX 637 (ALT 250 FOR IP): Performed by: CLINICAL NURSE SPECIALIST

## 2023-12-05 RX ORDER — DEXTROSE MONOHYDRATE 50 MG/ML
INJECTION, SOLUTION INTRAVENOUS CONTINUOUS
Status: ACTIVE | OUTPATIENT
Start: 2023-12-05 | End: 2023-12-07

## 2023-12-05 RX ORDER — CHLORHEXIDINE GLUCONATE ORAL RINSE 1.2 MG/ML
15 SOLUTION DENTAL 4 TIMES DAILY
Status: DISCONTINUED | OUTPATIENT
Start: 2023-12-05 | End: 2023-12-07 | Stop reason: HOSPADM

## 2023-12-05 RX ADMIN — PETROLATUM: 420 OINTMENT TOPICAL at 08:43

## 2023-12-05 RX ADMIN — LEVALBUTEROL HYDROCHLORIDE 0.31 MG: 0.31 SOLUTION RESPIRATORY (INHALATION) at 09:53

## 2023-12-05 RX ADMIN — 0.12% CHLORHEXIDINE GLUCONATE 15 ML: 1.2 RINSE ORAL at 13:08

## 2023-12-05 RX ADMIN — 0.12% CHLORHEXIDINE GLUCONATE 15 ML: 1.2 RINSE ORAL at 16:39

## 2023-12-05 RX ADMIN — ALBUMIN (HUMAN) 50 G: 25 SOLUTION INTRAVENOUS at 04:22

## 2023-12-05 RX ADMIN — DEXAMETHASONE SODIUM PHOSPHATE 6 MG: 10 INJECTION INTRAMUSCULAR; INTRAVENOUS at 08:42

## 2023-12-05 RX ADMIN — IPRATROPIUM BROMIDE 0.5 MG: 0.5 SOLUTION RESPIRATORY (INHALATION) at 09:53

## 2023-12-05 RX ADMIN — INSULIN LISPRO 16 UNITS: 100 INJECTION, SOLUTION INTRAVENOUS; SUBCUTANEOUS at 16:40

## 2023-12-05 RX ADMIN — LEVALBUTEROL HYDROCHLORIDE 0.31 MG: 0.31 SOLUTION RESPIRATORY (INHALATION) at 13:17

## 2023-12-05 RX ADMIN — CEFEPIME 2000 MG: 2 INJECTION, POWDER, FOR SOLUTION INTRAVENOUS at 06:22

## 2023-12-05 RX ADMIN — ARFORMOTEROL TARTRATE 15 MCG: 15 SOLUTION RESPIRATORY (INHALATION) at 09:53

## 2023-12-05 RX ADMIN — SODIUM CHLORIDE, PRESERVATIVE FREE 10 ML: 5 INJECTION INTRAVENOUS at 08:44

## 2023-12-05 RX ADMIN — DEXTROSE MONOHYDRATE: 50 INJECTION, SOLUTION INTRAVENOUS at 11:17

## 2023-12-05 NOTE — PLAN OF CARE
Problem: ABCDS Injury Assessment  Goal: Absence of physical injury  Outcome: Progressing     Problem: Skin/Tissue Integrity  Goal: Absence of new skin breakdown  Description: 1. Monitor for areas of redness and/or skin breakdown  2. Assess vascular access sites hourly  3. Every 4-6 hours minimum:  Change oxygen saturation probe site  4. Every 4-6 hours:  If on nasal continuous positive airway pressure, respiratory therapy assess nares and determine need for appliance change or resting period.   Outcome: Progressing     Problem: Safety - Adult  Goal: Free from fall injury  Outcome: Progressing     Problem: Nutrition Deficit:  Goal: Optimize nutritional status  Outcome: Not Progressing --- awaiting surgical plan  Flowsheets (Taken 12/4/2023 1430 by Jojo Sr RD, LD)  Nutrient intake appropriate for improving, restoring, or maintaining nutritional needs:   Assess nutritional status and recommend course of action   Monitor oral intake, labs, and treatment plans   Recommend, monitor, and adjust tube feedings and TPN/PPN based on assessed needs     Problem: Pain  Goal: Verbalizes/displays adequate comfort level or baseline comfort level  Outcome: Not Progressing --- patient having difficulty verbalizing needs     Problem: Nutrition Deficit:  Goal: Optimize nutritional status  Outcome: Not Progressing  Flowsheets (Taken 12/4/2023 1430 by Jojo Sr, JATIN, LD)  Nutrient intake appropriate for improving, restoring, or maintaining nutritional needs:   Assess nutritional status and recommend course of action   Monitor oral intake, labs, and treatment plans   Recommend, monitor, and adjust tube feedings and TPN/PPN based on assessed needs     Problem: Pain  Goal: Verbalizes/displays adequate comfort level or baseline comfort level  Outcome: Not Progressing

## 2023-12-05 NOTE — CONSULTS
[facility]  [facility]  Consultation Note     Admit Date: 11/29/2023  1:37 AM    Reason for Consult:   Sepsis    Attending Physician:  Eloise Godoy MD    HISTORY OF PRESENT ILLNESS:             The history is obtained from extensive review of available past medical records. The patient is a 81 y.o. male who is not known to the ID service. Patient has a past medical history of MI, CAD, hyperlipidemia, Parkinson's disease and rheumatoid arthritis. Patient presented to [facility] with complaints of shortness of breath, disorientation and weakness. Patient is asleep on examination and information was obtained per wife and daughter. They report he had a positive home COVID test this past Sunday. Some of his family members he was around over the holiday tested positive for COVID, therefore he tested when he started to not feel well. They report yesterday evening he was running a temperature between 100-101, he was confused and disoriented and his oxygen saturation was low when they checked him at home. They report a moist cough with no sputum production, state he has a hard time bringing up sputum due to his Parkinson's. They report he is pretty independent at home with his assistive devices but has just been declining recently. They state with his Parkinson's disease his swallowing function has weakened and he sometimes has difficulty swallowing; therefore his wife has been modifying his diet and feeding him. They also note some coughing with eating and drinking. Family reports he takes methotrexate weekly for his rheumatoid arthritis. Since admission, patient has been febrile, max temperature of 101.3. Patient was initially hypotensive and was started on a Levophed drip, this has been stopped. He is currently on 5 L nasal cannula saturating 100%, typically on room air. WBC 12. 1. proBNP 4084. Procalcitonin 2.80. . D-dimer 1545.   Blood cultures have been negative
Comprehensive Nutrition Assessment    Type and Reason for Visit:  Reassess, Consult    Nutrition Recommendations/Plan:   Continue NPO    TF recommendation when needed:    Diabetic (Glucerna 1.5) initiate @ 20 ml/hr, as tolerated advance to goal rate of 40 ml/hr + Wound Healing Modular BID (Artem in 180 ml free water BID) with 65 ml Q 4 hr free water flush  To provide: 960 ml tv, 1620 total kcal, 84 g total pro, 1479 ml total free water  Regimen meets ~100% calorie & protein needs    Patient should be considered at increased risk for metabolic complications R/T refeeding, characterized by drops in serum K, Mg, and Phos levels. These parameters should be closely monitored and be WNL prior to initiation or progression of feeding. Malnutrition Assessment:  Malnutrition Status: At risk for malnutrition (Comment) (12/04/23 8224)    Context:  Chronic Illness     Findings of the 6 clinical characteristics of malnutrition:  Energy Intake:  75% or less estimated energy requirements for 1 month or longer  Weight Loss:  No significant weight loss     Body Fat Loss:  Unable to assess (d/t covid isolation)     Muscle Mass Loss:  Unable to assess (d/t covid isolation, appears severe wasting at the clavicles from pt's room doorway)    Fluid Accumulation:  No significant fluid accumulation     Strength:  Not Performed    Nutrition Assessment:    Pt remains NPO d/t dysphagia with plan for PEG placement tomorrow. Wound noted. Will provide TF recommendation per discussion with pt's daughters & spouse and monitor.     Nutrition Related Findings:    A&O X1, I&Os not avail, no edema, abd soft/flat, hypo BS, Na 152/BUN 41, Glu 382 Wound Type: Pressure Injury, Stage II       Current Nutrition Intake & Therapies:    Average Meal Intake: NPO  Average Supplements Intake: NPO  Diet NPO    Anthropometric Measures:  Height: 180.3 cm (5' 11\")  Ideal Body Weight (IBW): 172 lbs (78 kg)    Admission Body Weight: 60.2 kg (132 lb 11.5 oz)
GENERAL SURGERY  CONSULT NOTE  12/4/2023    Physician Consulted: Dr. Chepe James  Reason for Consult: PEG  Referring Physician: Dr. Kindra GRIFFIN  Javier Rios is a 81 y.o. male who presents for evaluation of PEG tube insertion. Pt was admitted for pneumonia and is currently being treated with decadron, cefepime and levaquin. Pt failed MBS 11/30 and general surgery was consulted for PEG tube insertion. His daughter is in the room and states her father has not had any previous surgeries. She states she is in agreement to having the PEG tube placed and is hopeful it will be done today. Daughter denies any daily use of anticoagulation. AF, tachycardic  Pt is resting in bed on 5L O2. Abdomen is soft, nontender, nondistended without any previous scars noted. Labs WBC 25.2, Hb 11.2, Plt 274      Past Medical History:   Diagnosis Date    Acute MI, inferior wall (HCC)     CAD (coronary artery disease)     Hyperlipidemia     Statin intolerance     myositis        Past Surgical History:   Procedure Laterality Date    CORONARY ANGIOPLASTY  10/31/10    S/P PCI with 5 x 16 mm bare metal stent     DIAGNOSTIC CARDIAC CATH LAB PROCEDURE  10/31/10    JOINT REPLACEMENT         Medications Prior to Admission:    Prior to Admission medications    Medication Sig Start Date End Date Taking? Authorizing Provider   folic acid (FOLVITE) 1 MG tablet Take 1 tablet by mouth daily 8/21/23   Christal Sterling MD   CALMOSEPTINE 0.44-20.6 % OINT ointment APPLY TOPICALLY TO BOTH BUTTOCKS TWICE A DAY 10/28/22   Christal Sterling MD   Control Gel Formula Dressing (DUODERM CGF DRESSING) MISC APPLY TOPICALLY TO AFFECTED AREA 3 TIMES PER WEEK. CUT DRESSING TO SIZE PRIOR TO APPLICATION. LEAVE DRESSING ON FOR UP TO 72 HOURS.  CHANGED 8/16/22   Christal Sterling MD   diclofenac sodium (VOLTAREN) 1 % GEL Apply topically 2 times daily    Christal Sterling MD   levothyroxine (SYNTHROID) 150 MCG tablet TAKE ONE TABLET BY MOUTH DAILY IN THE
Palliative Care Department  [phone]  Palliative Care Initial Consult  Provider LANDEN Tolbert CNP      PATIENT: Marylene Gardener  : 1935  MRN: [id number]  ADMISSION DATE: 2023  1:37 AM  Referring Provider: LANDEN Hayes CNP    Palliative Medicine was consulted on hospital day 2 for assistance with Goals of care, Code Status Discussion  HPI:     Ashkan Acharya is a 81 y.o. y/o male with a history of CAD, hypertension, hyperlipidemia, Parkinson's disease, rheumatoid arthritis who presented to [facility]) on 2023 with shortness of breath. He had been diagnosed with COVID a week prior. In the ER he was found to have a fever and was tachycardic. CT of the chest showed multifocal pneumonia. ID is following and he was started on antibiotics for possible superimposed bacterial pneumonia associated with aspiration. He remains admitted to telemetry for further medical management. ASSESSMENT/PLAN:     Pertinent Hospital Diagnoses     COVID 19 infection  Acute hypoxic respiratory failure  Aspiration pneumonia     Palliative Care Encounter / Counseling Regarding Goals of Care  Please see detailed goals of care discussion as below  At this time, Marylene Gardener, Does Not have capacity for medical decision-making. Capacity is time limited and situation/question specific  During encounter Gabriella was surrogate medical decision-maker  Outcome of goals of care meeting:  Continue all current medical management and CODE STATUS as a full code  Code status Full Code  Advanced Directives: no POA or living will in [facility]  Surrogate/Legal NOK:  Tonio Keane [facility] - [phone]    Spiritual assessment: no spiritual distress identified  Bereavement and grief: to be determined  Referrals to: none today    Thank you for the opportunity to participate in the care of Marylene Gardener.      LANDEN Tolbert CNP   Palliative Medicine     SUBJECTIVE:     Details of Conversation: Chart
Pulmonary Consultation    Admit Date: 11/29/2023    Requesting Physician: Chuckie Garcia MD    CC:  increase o2 needs    HPI:  Olga Garcia 81 y.o. male with PMH of heart attack, CAD, hyperlipidemia, Parkinson's disease, rheumatoid arthritis on methotrexate weekly who presented to the ED 11/29 with complaints of increasing shortness of breath patient was found to be COVID-positive. CTA no PE, bilateral groundglass and consolidative opacities. Infectious disease was consulted patient was started on Levaquin and cefepime for pneumonia; he received Tocilizumab x1 and was started on remdesivir and IV Decadron 6 mg daily. Patient failed MBS 11/30 and remains strict n.p.o. patient was maintaining adequate saturations on 4 to 5 L nasal cannula at 11/30 overnight patient required 15 L high flow in addition to 15 L nonrebreather. ABG 7.4 6/28/63/20 0.2/91% on 30 L. Chest x-ray significant interval increase in bilateral patchy densities. Pulmonary consulted for increased FiO2 requirements. Patient seen on 15 L high flow +15 L nonrebreather with no complaints of shortness of breath, cough or wheezing. Patient was felt to be a poor historian therefore history obtained from wife and daughter along with chart review. PMH:    Past Medical History:   Diagnosis Date    Acute MI, inferior wall (HCC)     CAD (coronary artery disease)     Hyperlipidemia     Statin intolerance     myositis      PSH:    Past Surgical History:   Procedure Laterality Date    CORONARY ANGIOPLASTY  10/31/10    S/P PCI with 5 x 16 mm bare metal stent     DIAGNOSTIC CARDIAC CATH LAB PROCEDURE  10/31/10    JOINT REPLACEMENT            Respiratory ROS: negative for - cough, shortness of breath, or wheezing Otherwise, a complete review of systems is undertaken and is negative.     Social History:  Lifelong non-smoker lives at home with wife  Fully vaccinated and boosted for COVID, last booster 3 weeks ago    Social History     Socioeconomic
[facility]  Nephrology Consult Note    Patient's Name: Javier Rios     Reason for Consult: Hypernatremia    Chief Complaint: shortness of breath  History Obtained from: patient, electronic medical record, past medical records     History of Present Illness:     Javier Rios is an 81year old male, with past medical history of acute MI, CAD, and HLD, who presented to the ED on 11/29 with complaints of shortness of breath. He tested positive for COVID-19 on 11/29. Vital signs on 11/29 include temperature 101.3, pulse 106, RR 20, 100/58, 95% SPO2 on room air. Lab data on 11/29 includes sodium 131, potassium 4.2, chloride 91, CO2 26, BUN 15, creatinine 0.6, lactic acid 5.2, calcium 8.5, pro-BNP 4,084, albumin 3.1, WBC 12.1, Hg 11.9. Imaging on 11/29 includes xray of the chest which showed the cardiomediastinal silhouette is within normal limits, there are right  greater than left interstitial and airspace opacities, small bilateral  pleural effusions, no pneumothorax, there is advanced bilateral  glenohumeral joint arthrosis, and right greater than left interstitial and airspace opacities, concerning for multifocal pneumonia, suspect superimposed pulmonary edema; as well as CTA pulmonary which showed no evidence of pulmonary embolism, bilateral ground-glass and consolidative opacities, concerning for multifocal pneumonia, small bilateral pleural effusions. Nephrology was consulted 7 days into the hospital course for hypernatremia. The patient is not known to our service. At present, the patient was seen and examined. He is resting comfortably in bed, family at the bedside. He is not very interactive during my exam, family reports that his mentation has worsened over the last few days. Family reports that he has not eaten or drank anything since admission and that IV fluids were stopped on 12/1 because he became fluid overloaded. Due to patient's mental status, a comprehensive review of systems was not obtained.
MD  [address] Cardiology

## 2023-12-05 NOTE — PLAN OF CARE
Problem: ABCDS Injury Assessment  Goal: Absence of physical injury  12/4/2023 2129 by Nara Ramirez RN  Outcome: Progressing  Flowsheets (Taken 12/4/2023 2126)  Absence of Physical Injury: Implement safety measures based on patient assessment  12/4/2023 2017 by Catalino Carter RN  Outcome: Progressing     Problem: Skin/Tissue Integrity  Goal: Absence of new skin breakdown  Description: 1. Monitor for areas of redness and/or skin breakdown  2. Assess vascular access sites hourly  3. Every 4-6 hours minimum:  Change oxygen saturation probe site  4. Every 4-6 hours:  If on nasal continuous positive airway pressure, respiratory therapy assess nares and determine need for appliance change or resting period.   12/4/2023 2129 by Nara Ramirez RN  Outcome: Progressing  12/4/2023 2017 by Catalino Carter RN  Outcome: Progressing     Problem: Safety - Adult  Goal: Free from fall injury  12/4/2023 2129 by Nara Ramirez RN  Outcome: Jeronimo Harrington (Taken 12/4/2023 2126)  Free From Fall Injury: Instruct family/caregiver on patient safety  12/4/2023 2017 by Catalino Carter RN  Outcome: Progressing     Problem: Nutrition Deficit:  Goal: Optimize nutritional status  12/4/2023 2129 by Nara Ramirez RN  Outcome: Progressing  12/4/2023 2017 by Catalino Carter RN  Outcome: Not Progressing  Flowsheets (Taken 12/4/2023 1430 by Red Lockhart RD, LD)  Nutrient intake appropriate for improving, restoring, or maintaining nutritional needs:   Assess nutritional status and recommend course of action   Monitor oral intake, labs, and treatment plans   Recommend, monitor, and adjust tube feedings and TPN/PPN based on assessed needs     Problem: Pain  Goal: Verbalizes/displays adequate comfort level or baseline comfort level  12/4/2023 2129 by Nara Ramirez RN  Outcome: Progressing  12/4/2023 2017 by Catalino Carter RN  Outcome: Not Progressing     Problem:

## 2023-12-06 LAB
ALBUMIN SERPL-MCNC: 3.3 G/DL (ref 3.5–5.2)
ALP SERPL-CCNC: 39 U/L (ref 40–129)
ALT SERPL-CCNC: 17 U/L (ref 0–40)
ANION GAP SERPL CALCULATED.3IONS-SCNC: 10 MMOL/L (ref 7–16)
ANION GAP SERPL CALCULATED.3IONS-SCNC: 8 MMOL/L (ref 7–16)
AST SERPL-CCNC: 19 U/L (ref 0–39)
BASOPHILS # BLD: 0.03 K/UL (ref 0–0.2)
BASOPHILS NFR BLD: 0 % (ref 0–2)
BILIRUB DIRECT SERPL-MCNC: 0.7 MG/DL (ref 0–0.3)
BILIRUB INDIRECT SERPL-MCNC: 1.1 MG/DL (ref 0–1)
BILIRUB SERPL-MCNC: 1.8 MG/DL (ref 0–1.2)
BUN SERPL-MCNC: 40 MG/DL (ref 6–23)
BUN SERPL-MCNC: 45 MG/DL (ref 6–23)
CALCIUM SERPL-MCNC: 8.3 MG/DL (ref 8.6–10.2)
CALCIUM SERPL-MCNC: 8.4 MG/DL (ref 8.6–10.2)
CHLORIDE SERPL-SCNC: 116 MMOL/L (ref 98–107)
CHLORIDE SERPL-SCNC: 117 MMOL/L (ref 98–107)
CO2 SERPL-SCNC: 27 MMOL/L (ref 22–29)
CO2 SERPL-SCNC: 27 MMOL/L (ref 22–29)
CREAT SERPL-MCNC: 0.6 MG/DL (ref 0.7–1.2)
CREAT SERPL-MCNC: 0.6 MG/DL (ref 0.7–1.2)
CRP SERPL HS-MCNC: 10 MG/L (ref 0–5)
EOSINOPHIL # BLD: 0.01 K/UL (ref 0.05–0.5)
EOSINOPHILS RELATIVE PERCENT: 0 % (ref 0–6)
ERYTHROCYTE [DISTWIDTH] IN BLOOD BY AUTOMATED COUNT: 15.5 % (ref 11.5–15)
GFR SERPL CREATININE-BSD FRML MDRD: >60 ML/MIN/1.73M2
GFR SERPL CREATININE-BSD FRML MDRD: >60 ML/MIN/1.73M2
GLUCOSE BLD-MCNC: 173 MG/DL (ref 74–99)
GLUCOSE BLD-MCNC: 237 MG/DL (ref 74–99)
GLUCOSE BLD-MCNC: 325 MG/DL (ref 74–99)
GLUCOSE SERPL-MCNC: 241 MG/DL (ref 74–99)
GLUCOSE SERPL-MCNC: 343 MG/DL (ref 74–99)
HCT VFR BLD AUTO: 35.4 % (ref 37–54)
HGB BLD-MCNC: 11.1 G/DL (ref 12.5–16.5)
IMM GRANULOCYTES # BLD AUTO: 0.25 K/UL (ref 0–0.58)
IMM GRANULOCYTES NFR BLD: 1 % (ref 0–5)
LDH SERPL-CCNC: 270 U/L (ref 135–225)
LYMPHOCYTES NFR BLD: 7.76 K/UL (ref 1.5–4)
LYMPHOCYTES RELATIVE PERCENT: 36 % (ref 20–42)
MAGNESIUM SERPL-MCNC: 2.6 MG/DL (ref 1.6–2.6)
MCH RBC QN AUTO: 31.9 PG (ref 26–35)
MCHC RBC AUTO-ENTMCNC: 31.4 G/DL (ref 32–34.5)
MCV RBC AUTO: 101.7 FL (ref 80–99.9)
MONOCYTES NFR BLD: 0.49 K/UL (ref 0.1–0.95)
MONOCYTES NFR BLD: 2 % (ref 2–12)
NEUTROPHILS NFR BLD: 60 % (ref 43–80)
NEUTS SEG NFR BLD: 12.89 K/UL (ref 1.8–7.3)
PHOSPHATE SERPL-MCNC: 2.6 MG/DL (ref 2.5–4.5)
PLATELET # BLD AUTO: 247 K/UL (ref 130–450)
PMV BLD AUTO: 10 FL (ref 7–12)
POTASSIUM SERPL-SCNC: 3.7 MMOL/L (ref 3.5–5)
POTASSIUM SERPL-SCNC: 4 MMOL/L (ref 3.5–5)
PROT SERPL-MCNC: 5.2 G/DL (ref 6.4–8.3)
RBC # BLD AUTO: 3.48 M/UL (ref 3.8–5.8)
RBC # BLD: ABNORMAL 10*6/UL
SODIUM SERPL-SCNC: 152 MMOL/L (ref 132–146)
SODIUM SERPL-SCNC: 153 MMOL/L (ref 132–146)
WBC OTHER # BLD: 21.4 K/UL (ref 4.5–11.5)

## 2023-12-06 PROCEDURE — 83615 LACTATE (LD) (LDH) ENZYME: CPT

## 2023-12-06 PROCEDURE — 82962 GLUCOSE BLOOD TEST: CPT

## 2023-12-06 PROCEDURE — 82248 BILIRUBIN DIRECT: CPT

## 2023-12-06 PROCEDURE — 2500000003 HC RX 250 WO HCPCS: Performed by: NURSE PRACTITIONER

## 2023-12-06 PROCEDURE — 99233 SBSQ HOSP IP/OBS HIGH 50: CPT | Performed by: INTERNAL MEDICINE

## 2023-12-06 PROCEDURE — 6370000000 HC RX 637 (ALT 250 FOR IP): Performed by: CLINICAL NURSE SPECIALIST

## 2023-12-06 PROCEDURE — 2700000000 HC OXYGEN THERAPY PER DAY

## 2023-12-06 PROCEDURE — 80053 COMPREHEN METABOLIC PANEL: CPT

## 2023-12-06 PROCEDURE — 6370000000 HC RX 637 (ALT 250 FOR IP): Performed by: FAMILY MEDICINE

## 2023-12-06 PROCEDURE — 83735 ASSAY OF MAGNESIUM: CPT

## 2023-12-06 PROCEDURE — 6360000002 HC RX W HCPCS

## 2023-12-06 PROCEDURE — 6360000002 HC RX W HCPCS: Performed by: NURSE PRACTITIONER

## 2023-12-06 PROCEDURE — 84100 ASSAY OF PHOSPHORUS: CPT

## 2023-12-06 PROCEDURE — 99233 SBSQ HOSP IP/OBS HIGH 50: CPT | Performed by: NURSE PRACTITIONER

## 2023-12-06 PROCEDURE — 36415 COLL VENOUS BLD VENIPUNCTURE: CPT

## 2023-12-06 PROCEDURE — 85025 COMPLETE CBC W/AUTO DIFF WBC: CPT

## 2023-12-06 PROCEDURE — 2060000000 HC ICU INTERMEDIATE R&B

## 2023-12-06 PROCEDURE — 80048 BASIC METABOLIC PNL TOTAL CA: CPT

## 2023-12-06 PROCEDURE — 2580000003 HC RX 258

## 2023-12-06 PROCEDURE — 86140 C-REACTIVE PROTEIN: CPT

## 2023-12-06 PROCEDURE — 2580000003 HC RX 258: Performed by: INTERNAL MEDICINE

## 2023-12-06 RX ORDER — MORPHINE SULFATE 2 MG/ML
1 INJECTION, SOLUTION INTRAMUSCULAR; INTRAVENOUS
Status: DISCONTINUED | OUTPATIENT
Start: 2023-12-06 | End: 2023-12-07 | Stop reason: HOSPADM

## 2023-12-06 RX ORDER — GLYCOPYRROLATE 0.2 MG/ML
0.4 INJECTION INTRAMUSCULAR; INTRAVENOUS EVERY 4 HOURS PRN
Status: DISCONTINUED | OUTPATIENT
Start: 2023-12-06 | End: 2023-12-07 | Stop reason: HOSPADM

## 2023-12-06 RX ORDER — LORAZEPAM 2 MG/ML
1 INJECTION INTRAMUSCULAR EVERY 4 HOURS PRN
Status: DISCONTINUED | OUTPATIENT
Start: 2023-12-06 | End: 2023-12-07 | Stop reason: HOSPADM

## 2023-12-06 RX ADMIN — PETROLATUM: 420 OINTMENT TOPICAL at 10:03

## 2023-12-06 RX ADMIN — INSULIN LISPRO 12 UNITS: 100 INJECTION, SOLUTION INTRAVENOUS; SUBCUTANEOUS at 11:41

## 2023-12-06 RX ADMIN — PETROLATUM: 420 OINTMENT TOPICAL at 19:46

## 2023-12-06 RX ADMIN — ENOXAPARIN SODIUM 40 MG: 100 INJECTION SUBCUTANEOUS at 09:34

## 2023-12-06 RX ADMIN — 0.12% CHLORHEXIDINE GLUCONATE 15 ML: 1.2 RINSE ORAL at 09:35

## 2023-12-06 RX ADMIN — GLYCOPYRROLATE 0.4 MG: 0.2 INJECTION INTRAMUSCULAR; INTRAVENOUS at 22:30

## 2023-12-06 RX ADMIN — SODIUM CHLORIDE, PRESERVATIVE FREE 10 ML: 5 INJECTION INTRAVENOUS at 09:31

## 2023-12-06 RX ADMIN — DEXAMETHASONE SODIUM PHOSPHATE 6 MG: 10 INJECTION INTRAMUSCULAR; INTRAVENOUS at 09:31

## 2023-12-06 RX ADMIN — SODIUM CHLORIDE, PRESERVATIVE FREE 10 ML: 5 INJECTION INTRAVENOUS at 19:51

## 2023-12-06 RX ADMIN — DEXTROSE MONOHYDRATE: 50 INJECTION, SOLUTION INTRAVENOUS at 11:46

## 2023-12-06 ASSESSMENT — PAIN SCALES - GENERAL: PAINLEVEL_OUTOF10: 0

## 2023-12-06 NOTE — PLAN OF CARE
Problem: ABCDS Injury Assessment  Goal: Absence of physical injury  12/6/2023 0017 by Galilea Dixon RN  Outcome: Progressing  Flowsheets (Taken 12/6/2023 0015)  Absence of Physical Injury: Implement safety measures based on patient assessment  12/5/2023 1522 by Matthew Hernández RN  Outcome: Progressing     Problem: Skin/Tissue Integrity  Goal: Absence of new skin breakdown  Description: 1. Monitor for areas of redness and/or skin breakdown  2. Assess vascular access sites hourly  3. Every 4-6 hours minimum:  Change oxygen saturation probe site  4. Every 4-6 hours:  If on nasal continuous positive airway pressure, respiratory therapy assess nares and determine need for appliance change or resting period.   12/6/2023 0017 by Galilea Dixon RN  Outcome: Progressing  12/5/2023 1522 by Matthew Hernández RN  Outcome: Progressing     Problem: Safety - Adult  Goal: Free from fall injury  12/6/2023 0017 by Galilea Dixon RN  Outcome: Progressing  Flowsheets (Taken 12/6/2023 0015)  Free From Fall Injury: Instruct family/caregiver on patient safety  12/5/2023 1522 by Matthew Hernández RN  Outcome: Progressing     Problem: Nutrition Deficit:  Goal: Optimize nutritional status  12/6/2023 0017 by Galilea Dixon RN  Outcome: Progressing  12/5/2023 1522 by Matthew Hernández RN  Outcome: Progressing     Problem: Pain  Goal: Verbalizes/displays adequate comfort level or baseline comfort level  12/6/2023 0017 by Galilea Dixon RN  Outcome: Progressing  12/5/2023 1522 by Matthew Hernández RN  Outcome: Progressing

## 2023-12-07 VITALS
BODY MASS INDEX: 19.51 KG/M2 | RESPIRATION RATE: 22 BRPM | DIASTOLIC BLOOD PRESSURE: 48 MMHG | SYSTOLIC BLOOD PRESSURE: 114 MMHG | TEMPERATURE: 98.9 F | OXYGEN SATURATION: 96 % | WEIGHT: 139.33 LBS | HEIGHT: 71 IN | HEART RATE: 84 BPM

## 2023-12-07 PROCEDURE — 2500000003 HC RX 250 WO HCPCS: Performed by: NURSE PRACTITIONER

## 2023-12-07 PROCEDURE — 6360000002 HC RX W HCPCS: Performed by: NURSE PRACTITIONER

## 2023-12-07 PROCEDURE — 2700000000 HC OXYGEN THERAPY PER DAY

## 2023-12-07 PROCEDURE — 2580000003 HC RX 258

## 2023-12-07 RX ADMIN — GLYCOPYRROLATE 0.4 MG: 0.2 INJECTION INTRAMUSCULAR; INTRAVENOUS at 05:19

## 2023-12-07 RX ADMIN — SODIUM CHLORIDE, PRESERVATIVE FREE 10 ML: 5 INJECTION INTRAVENOUS at 09:40

## 2023-12-07 RX ADMIN — MORPHINE SULFATE 1 MG: 2 INJECTION, SOLUTION INTRAMUSCULAR; INTRAVENOUS at 09:40

## 2023-12-07 RX ADMIN — GLYCOPYRROLATE 0.4 MG: 0.2 INJECTION INTRAMUSCULAR; INTRAVENOUS at 09:40

## 2023-12-07 RX ADMIN — LORAZEPAM 1 MG: 2 INJECTION INTRAMUSCULAR; INTRAVENOUS at 09:41

## 2023-12-07 ASSESSMENT — PAIN SCALES - GENERAL: PAINLEVEL_OUTOF10: 2
